# Patient Record
Sex: MALE | Race: WHITE | NOT HISPANIC OR LATINO | URBAN - METROPOLITAN AREA
[De-identification: names, ages, dates, MRNs, and addresses within clinical notes are randomized per-mention and may not be internally consistent; named-entity substitution may affect disease eponyms.]

---

## 2017-08-17 ENCOUNTER — EMERGENCY (EMERGENCY)
Facility: HOSPITAL | Age: 27
LOS: 0 days | Discharge: HOME | End: 2017-08-17
Admitting: FAMILY MEDICINE

## 2017-08-17 DIAGNOSIS — F32.9 MAJOR DEPRESSIVE DISORDER, SINGLE EPISODE, UNSPECIFIED: ICD-10-CM

## 2017-08-17 DIAGNOSIS — R50.9 FEVER, UNSPECIFIED: ICD-10-CM

## 2017-08-17 DIAGNOSIS — R19.7 DIARRHEA, UNSPECIFIED: ICD-10-CM

## 2017-08-17 DIAGNOSIS — F60.9 PERSONALITY DISORDER, UNSPECIFIED: ICD-10-CM

## 2017-08-17 DIAGNOSIS — F17.200 NICOTINE DEPENDENCE, UNSPECIFIED, UNCOMPLICATED: ICD-10-CM

## 2017-08-17 DIAGNOSIS — Z88.8 ALLERGY STATUS TO OTHER DRUGS, MEDICAMENTS AND BIOLOGICAL SUBSTANCES STATUS: ICD-10-CM

## 2017-08-17 DIAGNOSIS — J45.909 UNSPECIFIED ASTHMA, UNCOMPLICATED: ICD-10-CM

## 2017-08-17 DIAGNOSIS — F41.0 PANIC DISORDER [EPISODIC PAROXYSMAL ANXIETY]: ICD-10-CM

## 2017-08-17 DIAGNOSIS — R51 HEADACHE: ICD-10-CM

## 2017-08-17 DIAGNOSIS — F13.20 SEDATIVE, HYPNOTIC OR ANXIOLYTIC DEPENDENCE, UNCOMPLICATED: ICD-10-CM

## 2017-08-17 DIAGNOSIS — F41.9 ANXIETY DISORDER, UNSPECIFIED: ICD-10-CM

## 2017-08-17 DIAGNOSIS — F11.20 OPIOID DEPENDENCE, UNCOMPLICATED: ICD-10-CM

## 2017-08-17 DIAGNOSIS — F31.9 BIPOLAR DISORDER, UNSPECIFIED: ICD-10-CM

## 2018-01-31 PROBLEM — Z00.00 ENCOUNTER FOR PREVENTIVE HEALTH EXAMINATION: Status: ACTIVE | Noted: 2018-01-31

## 2018-11-08 ENCOUNTER — LABORATORY RESULT (OUTPATIENT)
Age: 28
End: 2018-11-08

## 2018-11-08 ENCOUNTER — OUTPATIENT (OUTPATIENT)
Dept: OUTPATIENT SERVICES | Facility: HOSPITAL | Age: 28
LOS: 1 days | Discharge: HOME | End: 2018-11-08

## 2018-11-08 ENCOUNTER — APPOINTMENT (OUTPATIENT)
Dept: UROLOGY | Facility: CLINIC | Age: 28
End: 2018-11-08
Payer: COMMERCIAL

## 2018-11-08 VITALS
DIASTOLIC BLOOD PRESSURE: 73 MMHG | BODY MASS INDEX: 21.66 KG/M2 | WEIGHT: 138 LBS | HEART RATE: 91 BPM | HEIGHT: 67 IN | SYSTOLIC BLOOD PRESSURE: 118 MMHG

## 2018-11-08 DIAGNOSIS — Z72.0 TOBACCO USE: ICD-10-CM

## 2018-11-08 DIAGNOSIS — Z84.89 FAMILY HISTORY OF OTHER SPECIFIED CONDITIONS: ICD-10-CM

## 2018-11-08 DIAGNOSIS — K21.9 GASTRO-ESOPHAGEAL REFLUX DISEASE W/OUT ESOPHAGITIS: ICD-10-CM

## 2018-11-08 DIAGNOSIS — Z78.9 OTHER SPECIFIED HEALTH STATUS: ICD-10-CM

## 2018-11-08 DIAGNOSIS — Z87.440 PERSONAL HISTORY OF URINARY (TRACT) INFECTIONS: ICD-10-CM

## 2018-11-08 PROCEDURE — 99243 OFF/OP CNSLTJ NEW/EST LOW 30: CPT

## 2018-11-08 RX ORDER — RANITIDINE 300 MG/1
300 TABLET ORAL
Refills: 0 | Status: ACTIVE | COMMUNITY

## 2018-11-08 RX ORDER — TESTOSTERONE 30 MG/1.5ML
SOLUTION TOPICAL
Refills: 0 | Status: ACTIVE | COMMUNITY

## 2018-11-09 DIAGNOSIS — N39.0 URINARY TRACT INFECTION, SITE NOT SPECIFIED: ICD-10-CM

## 2018-11-11 NOTE — LETTER BODY
[Dear  ___] : Dear  [unfilled], [Consult Letter:] : I had the pleasure of evaluating your patient, [unfilled]. [Please see my note below.] : Please see my note below. [Consult Closing:] : Thank you very much for allowing me to participate in the care of this patient.  If you have any questions, please do not hesitate to contact me. [Sincerely,] : Sincerely, [FreeTextEntry2] : Dr. Anthony Paulino

## 2018-11-11 NOTE — REVIEW OF SYSTEMS
[see HPI] : see HPI [Dysuria] : dysuria [Negative] : Heme/Lymph [Incontinence] : no incontinence [Nocturia] : no nocturia

## 2018-11-11 NOTE — HISTORY OF PRESENT ILLNESS
[None] : None [FreeTextEntry1] : RADHA NGUYEN is a 28 year old male with a past medical history of  . Presents to the office today for UTI  x 4 months. Patient states that since 6/2018, patient states he was diagnosed by ALEXSANDRA Paulino and was treated with different antibiotics but UTI never resolved. Denies gross hematuria, dysuria, fever, or chills.\par \par 10/8/2018 UA.- negative/ Urine culture 10,000-50,000 Enterobacter aerogenes\par 9/6/2018 UA negative/Urine culture 1,000-9,000 Staphylococcus epidermidis\par \par \par US 8/27/2018\par -Prevoid 460 cc/Postvoid 32 cc\par -No bladder mass\par -No hydronephrosis or stones bilaterally\par -4-5 mm hyperechoic focus in the renal cortex of the left mid kidney\par

## 2018-11-11 NOTE — ASSESSMENT
[Urinary Tract Infection (599.0\N39.0)] : qualitative ~C was positive [FreeTextEntry1] : 28 year old transgender female to male with female external genitalia\par with UTI x 4 months, asymptomatic\par \par -UA and urine culture ordered, instructed on how to obtain a clean catch urine\par -F/U in 2 weeks\par - discussed the importance of obtaining an appropriate urine sample to detrmine the presence of an active UTI\par discussed the potential for false positive urine cultures\par will re-assess once urine studies completed

## 2018-11-11 NOTE — END OF VISIT
[FreeTextEntry3] : I have seen and Evaluated the patient with NP Renee Shirley\par I agree with the content of her progress note and the plan of care outlined\par

## 2018-11-12 LAB
APPEARANCE: ABNORMAL
BACTERIA UR CULT: NORMAL
BILIRUBIN URINE: NEGATIVE
BLOOD URINE: NEGATIVE
COLOR: YELLOW
GLUCOSE QUALITATIVE U: NEGATIVE MG/DL
KETONES URINE: NEGATIVE
LEUKOCYTE ESTERASE URINE: NEGATIVE
NITRITE URINE: NEGATIVE
PH URINE: 7.5
PROTEIN URINE: NEGATIVE MG/DL
SPECIFIC GRAVITY URINE: 1.02
UROBILINOGEN URINE: 0.2 MG/DL (ref 0.2–?)

## 2018-11-29 ENCOUNTER — APPOINTMENT (OUTPATIENT)
Dept: UROLOGY | Facility: CLINIC | Age: 28
End: 2018-11-29
Payer: COMMERCIAL

## 2018-11-29 VITALS
WEIGHT: 138 LBS | HEIGHT: 67 IN | SYSTOLIC BLOOD PRESSURE: 109 MMHG | BODY MASS INDEX: 21.66 KG/M2 | HEART RATE: 72 BPM | DIASTOLIC BLOOD PRESSURE: 66 MMHG

## 2018-11-29 DIAGNOSIS — N39.0 URINARY TRACT INFECTION, SITE NOT SPECIFIED: ICD-10-CM

## 2018-11-29 PROCEDURE — 99213 OFFICE O/P EST LOW 20 MIN: CPT

## 2019-02-25 ENCOUNTER — OUTPATIENT (OUTPATIENT)
Dept: OUTPATIENT SERVICES | Facility: HOSPITAL | Age: 29
LOS: 1 days | Discharge: HOME | End: 2019-02-25

## 2019-02-25 DIAGNOSIS — E29.1 TESTICULAR HYPOFUNCTION: ICD-10-CM

## 2019-02-25 DIAGNOSIS — R94.6 ABNORMAL RESULTS OF THYROID FUNCTION STUDIES: ICD-10-CM

## 2019-02-25 DIAGNOSIS — E13.9 OTHER SPECIFIED DIABETES MELLITUS WITHOUT COMPLICATIONS: ICD-10-CM

## 2019-02-25 DIAGNOSIS — R53.82 CHRONIC FATIGUE, UNSPECIFIED: ICD-10-CM

## 2019-02-25 DIAGNOSIS — Z00.00 ENCOUNTER FOR GENERAL ADULT MEDICAL EXAMINATION WITHOUT ABNORMAL FINDINGS: ICD-10-CM

## 2019-02-25 DIAGNOSIS — E78.5 HYPERLIPIDEMIA, UNSPECIFIED: ICD-10-CM

## 2020-11-01 ENCOUNTER — INPATIENT (INPATIENT)
Facility: HOSPITAL | Age: 30
LOS: 0 days | Discharge: HOME | End: 2020-11-02
Attending: INTERNAL MEDICINE | Admitting: INTERNAL MEDICINE
Payer: COMMERCIAL

## 2020-11-01 VITALS
OXYGEN SATURATION: 98 % | RESPIRATION RATE: 18 BRPM | WEIGHT: 145.06 LBS | DIASTOLIC BLOOD PRESSURE: 81 MMHG | SYSTOLIC BLOOD PRESSURE: 142 MMHG | HEART RATE: 112 BPM | TEMPERATURE: 99 F

## 2020-11-01 LAB
ALBUMIN SERPL ELPH-MCNC: 4.2 G/DL — SIGNIFICANT CHANGE UP (ref 3.5–5.2)
ALP SERPL-CCNC: 74 U/L — SIGNIFICANT CHANGE UP (ref 30–115)
ALT FLD-CCNC: 106 U/L — HIGH (ref 0–41)
ANION GAP SERPL CALC-SCNC: 16 MMOL/L — HIGH (ref 7–14)
APPEARANCE UR: CLEAR — SIGNIFICANT CHANGE UP
AST SERPL-CCNC: 217 U/L — HIGH (ref 0–41)
BASE EXCESS BLDV CALC-SCNC: 3 MMOL/L — HIGH (ref -2–2)
BASOPHILS # BLD AUTO: 0.03 K/UL — SIGNIFICANT CHANGE UP (ref 0–0.2)
BASOPHILS NFR BLD AUTO: 0.8 % — SIGNIFICANT CHANGE UP (ref 0–1)
BILIRUB DIRECT SERPL-MCNC: 0.2 MG/DL — SIGNIFICANT CHANGE UP (ref 0–0.2)
BILIRUB INDIRECT FLD-MCNC: 0.4 MG/DL — SIGNIFICANT CHANGE UP (ref 0.2–1.2)
BILIRUB SERPL-MCNC: 0.6 MG/DL — SIGNIFICANT CHANGE UP (ref 0.2–1.2)
BILIRUB UR-MCNC: NEGATIVE — SIGNIFICANT CHANGE UP
BUN SERPL-MCNC: 19 MG/DL — SIGNIFICANT CHANGE UP (ref 10–20)
CA-I SERPL-SCNC: 1.11 MMOL/L — LOW (ref 1.12–1.3)
CALCIUM SERPL-MCNC: 9.2 MG/DL — SIGNIFICANT CHANGE UP (ref 8.5–10.1)
CHLORIDE SERPL-SCNC: 98 MMOL/L — SIGNIFICANT CHANGE UP (ref 98–110)
CO2 SERPL-SCNC: 23 MMOL/L — SIGNIFICANT CHANGE UP (ref 17–32)
COLOR SPEC: SIGNIFICANT CHANGE UP
CREAT SERPL-MCNC: 0.9 MG/DL — SIGNIFICANT CHANGE UP (ref 0.7–1.5)
DIFF PNL FLD: NEGATIVE — SIGNIFICANT CHANGE UP
EOSINOPHIL # BLD AUTO: 0.06 K/UL — SIGNIFICANT CHANGE UP (ref 0–0.7)
EOSINOPHIL NFR BLD AUTO: 1.6 % — SIGNIFICANT CHANGE UP (ref 0–8)
GAS PNL BLDV: 137 MMOL/L — SIGNIFICANT CHANGE UP (ref 136–145)
GAS PNL BLDV: SIGNIFICANT CHANGE UP
GAS PNL BLDV: SIGNIFICANT CHANGE UP
GLUCOSE SERPL-MCNC: 93 MG/DL — SIGNIFICANT CHANGE UP (ref 70–99)
GLUCOSE UR QL: NEGATIVE — SIGNIFICANT CHANGE UP
HCO3 BLDV-SCNC: 27 MMOL/L — SIGNIFICANT CHANGE UP (ref 22–29)
HCT VFR BLD CALC: 41.9 % — LOW (ref 42–52)
HCT VFR BLDA CALC: 48 % — HIGH (ref 34–44)
HGB BLD CALC-MCNC: 15.7 G/DL — SIGNIFICANT CHANGE UP (ref 14–18)
HGB BLD-MCNC: 14.5 G/DL — SIGNIFICANT CHANGE UP (ref 14–18)
IMM GRANULOCYTES NFR BLD AUTO: 0.3 % — SIGNIFICANT CHANGE UP (ref 0.1–0.3)
KETONES UR-MCNC: ABNORMAL
LACTATE BLDV-MCNC: 2.3 MMOL/L — HIGH (ref 0.5–1.6)
LACTATE SERPL-SCNC: 4.3 MMOL/L — CRITICAL HIGH (ref 0.7–2)
LEUKOCYTE ESTERASE UR-ACNC: NEGATIVE — SIGNIFICANT CHANGE UP
LIDOCAIN IGE QN: 30 U/L — SIGNIFICANT CHANGE UP (ref 7–60)
LYMPHOCYTES # BLD AUTO: 0.92 K/UL — LOW (ref 1.2–3.4)
LYMPHOCYTES # BLD AUTO: 23.8 % — SIGNIFICANT CHANGE UP (ref 20.5–51.1)
MCHC RBC-ENTMCNC: 34.6 G/DL — SIGNIFICANT CHANGE UP (ref 32–37)
MCHC RBC-ENTMCNC: 34.9 PG — HIGH (ref 27–31)
MCV RBC AUTO: 100.7 FL — HIGH (ref 80–94)
MONOCYTES # BLD AUTO: 0.51 K/UL — SIGNIFICANT CHANGE UP (ref 0.1–0.6)
MONOCYTES NFR BLD AUTO: 13.2 % — HIGH (ref 1.7–9.3)
NEUTROPHILS # BLD AUTO: 2.33 K/UL — SIGNIFICANT CHANGE UP (ref 1.4–6.5)
NEUTROPHILS NFR BLD AUTO: 60.3 % — SIGNIFICANT CHANGE UP (ref 42.2–75.2)
NITRITE UR-MCNC: NEGATIVE — SIGNIFICANT CHANGE UP
NRBC # BLD: 0 /100 WBCS — SIGNIFICANT CHANGE UP (ref 0–0)
PCO2 BLDV: 37 MMHG — LOW (ref 41–51)
PH BLDV: 7.47 — HIGH (ref 7.26–7.43)
PH UR: 7 — SIGNIFICANT CHANGE UP (ref 5–8)
PLATELET # BLD AUTO: 168 K/UL — SIGNIFICANT CHANGE UP (ref 130–400)
PO2 BLDV: 49 MMHG — HIGH (ref 20–40)
POTASSIUM BLDV-SCNC: 3.8 MMOL/L — SIGNIFICANT CHANGE UP (ref 3.3–5.6)
POTASSIUM SERPL-MCNC: 4.3 MMOL/L — SIGNIFICANT CHANGE UP (ref 3.5–5)
POTASSIUM SERPL-SCNC: 4.3 MMOL/L — SIGNIFICANT CHANGE UP (ref 3.5–5)
PROT SERPL-MCNC: 6.6 G/DL — SIGNIFICANT CHANGE UP (ref 6–8)
PROT UR-MCNC: SIGNIFICANT CHANGE UP
RBC # BLD: 4.16 M/UL — LOW (ref 4.7–6.1)
RBC # FLD: 11.5 % — SIGNIFICANT CHANGE UP (ref 11.5–14.5)
SAO2 % BLDV: 86 % — SIGNIFICANT CHANGE UP
SODIUM SERPL-SCNC: 137 MMOL/L — SIGNIFICANT CHANGE UP (ref 135–146)
SP GR SPEC: >1.05 (ref 1.01–1.03)
UROBILINOGEN FLD QL: SIGNIFICANT CHANGE UP
WBC # BLD: 3.86 K/UL — LOW (ref 4.8–10.8)
WBC # FLD AUTO: 3.86 K/UL — LOW (ref 4.8–10.8)

## 2020-11-01 PROCEDURE — 76705 ECHO EXAM OF ABDOMEN: CPT | Mod: 26

## 2020-11-01 PROCEDURE — 99223 1ST HOSP IP/OBS HIGH 75: CPT

## 2020-11-01 PROCEDURE — 93010 ELECTROCARDIOGRAM REPORT: CPT

## 2020-11-01 PROCEDURE — 74177 CT ABD & PELVIS W/CONTRAST: CPT | Mod: 26

## 2020-11-01 PROCEDURE — 99285 EMERGENCY DEPT VISIT HI MDM: CPT

## 2020-11-01 PROCEDURE — 71045 X-RAY EXAM CHEST 1 VIEW: CPT | Mod: 26

## 2020-11-01 RX ORDER — MORPHINE SULFATE 50 MG/1
4 CAPSULE, EXTENDED RELEASE ORAL ONCE
Refills: 0 | Status: DISCONTINUED | OUTPATIENT
Start: 2020-11-01 | End: 2020-11-01

## 2020-11-01 RX ORDER — SODIUM CHLORIDE 9 MG/ML
1000 INJECTION, SOLUTION INTRAVENOUS ONCE
Refills: 0 | Status: COMPLETED | OUTPATIENT
Start: 2020-11-01 | End: 2020-11-01

## 2020-11-01 RX ORDER — ONDANSETRON 8 MG/1
4 TABLET, FILM COATED ORAL ONCE
Refills: 0 | Status: COMPLETED | OUTPATIENT
Start: 2020-11-01 | End: 2020-11-01

## 2020-11-01 RX ADMIN — Medication 50 MILLIGRAM(S): at 14:30

## 2020-11-01 RX ADMIN — SODIUM CHLORIDE 1000 MILLILITER(S): 9 INJECTION, SOLUTION INTRAVENOUS at 16:05

## 2020-11-01 RX ADMIN — SODIUM CHLORIDE 1000 MILLILITER(S): 9 INJECTION, SOLUTION INTRAVENOUS at 14:27

## 2020-11-01 RX ADMIN — MORPHINE SULFATE 4 MILLIGRAM(S): 50 CAPSULE, EXTENDED RELEASE ORAL at 21:07

## 2020-11-01 RX ADMIN — MORPHINE SULFATE 4 MILLIGRAM(S): 50 CAPSULE, EXTENDED RELEASE ORAL at 14:24

## 2020-11-01 RX ADMIN — Medication 2 MILLIGRAM(S): at 21:56

## 2020-11-01 RX ADMIN — ONDANSETRON 4 MILLIGRAM(S): 8 TABLET, FILM COATED ORAL at 14:22

## 2020-11-01 NOTE — ED PROVIDER NOTE - PROGRESS NOTE DETAILS
fuchs: patient persistently tremulous, tachycardiac. patient tolerated librium. will give ativan and reassess. pt noted to have a cbc of 10. gi and surgery paged. awaiting call back. discussed case with gi, recommend pain medication and fluids. alcoholic hepatitis vs choledocholithiais. will evaluate tomorrow on floor. leopoldo: pt signed out to Dr. Hughes. discussed case w/ surgery, they are aware and will eval in ed. patient evaluated by surgery, no interventions at this time. will follow on floor.

## 2020-11-01 NOTE — CONSULT NOTE ADULT - SUBJECTIVE AND OBJECTIVE BOX
RADHA NGUYEN [id number]  30y Male PMH/PSH listed below presents with 4-5 days of epigastric abdominal pain radiating to left side. Pain has been progressively worsening since onset and progressed to having episdoes of nausea and bilious emesis. Due to persistence of symptoms presented to ED for evaluation and recommendations' Workup showed hepatic steatosis and CBD of 10mm. Patient has history of reflux treated with famotidine but has no other similar episodes. Recently started new medication "Vibrant" day before onset of symptoms. Denies ilicit drug use but smokes ~5 cigarettes per day and states he has consumed at least 12 shots of vodka per day since COVID quarantine      PAST MEDICAL & SURGICAL HISTORY:  Depression/Anxiety  Hysterectomy  Mastectomy        Allergies    fish (Other)  iodine (Other)    Intolerances        REVIEW OF SYSTEMS    [X] A ten-point review of systems was otherwise negative except as noted.  [ ] Due to altered mental status/intubation, subjective information were not able to be obtained from the patient. History was obtained, to the extent possible, from review of the chart and collateral sources of information.      Vital Signs Last 24 Hrs  T(C): 37 (01 Nov 2020 21:41), Max: 37 (01 Nov 2020 13:10)  T(F): 98.6 (01 Nov 2020 21:41), Max: 98.6 (01 Nov 2020 13:10)  HR: 94 (01 Nov 2020 21:41) (71 - 112)  BP: 118/83 (01 Nov 2020 21:41) (111/56 - 142/81)  BP(mean): --  RR: 18 (01 Nov 2020 21:41) (18 - 18)  SpO2: 99% (01 Nov 2020 21:41) (97% - 99%)    PHYSICAL EXAM:  GENERAL: NAD  CHEST/LUNG: Clear to auscultation bilaterally  HEART: Regular rate and rhythm  ABDOMEN: Soft, epigastric tenderness, pain radiates to left side, Nondistended;   EXTREMITIES:  No clubbing, cyanosis, or edema      LABS:  Labs:  CAPILLARY BLOOD GLUCOSE                              14.5   3.86  )-----------( 168      ( 01 Nov 2020 14:02 )             41.9       Auto Neutrophil %: 60.3 % (11-01-20 @ 14:02)  Auto Immature Granulocyte %: 0.3 % (11-01-20 @ 14:02)    11-01    137  |  98  |  19  ----------------------------<  93  4.3   |  23  |  0.9      Calcium, Total Serum: 9.2 mg/dL (11-01-20 @ 14:02)      LFTs:             6.6  | 0.6  | 217      ------------------[74      ( 01 Nov 2020 14:02 )  4.2  | 0.2  | 106         Lipase:30     Amylase:x         Blood Gas Venous - Lactate: 2.3 mmoL/L (11-01-20 @ 20:05)  Lactate, Blood: 4.3 mmol/L (11-01-20 @ 14:02)      Coags:            Urinalysis Basic - ( 01 Nov 2020 18:05 )    Color: Light Yellow / Appearance: Clear / SG: >1.050 / pH: x  Gluc: x / Ketone: Small  / Bili: Negative / Urobili: <2 mg/dL   Blood: x / Protein: Trace / Nitrite: Negative   Leuk Esterase: Negative / RBC: x / WBC x   Sq Epi: x / Non Sq Epi: x / Bacteria: x            RADIOLOGY & ADDITIONAL STUDIES:  < from: US Abdomen Limited (11.01.20 @ 19:41) >    Hepatic steatosis.    Dilated common bile duct measuring 10 mm.      < end of copied text >  < from: CT Abdomen and Pelvis w/ IV Cont (11.01.20 @ 16:08) >  1. New diffuse hepatic steatosis and dilated common bile duct    2. Status post hysterectomy.    < end of copied text >

## 2020-11-01 NOTE — ED ADULT TRIAGE NOTE - CHIEF COMPLAINT QUOTE
Pt presenting with abdominal pain that radiates to the left flank area since Thursday and had 2 episodes of vomiting, pt reports he has "12 shots of vodka daily" last drink was this morning.  Pt unable to tolerate PO.  Denies any SI or HI

## 2020-11-01 NOTE — CONSULT NOTE ADULT - ASSESSMENT
30M with elevated lfts, hepatic steatosis and dilated CBD presents with several days of abdominal pain    Plan:  No acute surgical intervention at this time  GI evaluation  Trend LFTs  MRCP  Pain/symptom control    Will continue to follow     d/w Dr. Serna

## 2020-11-01 NOTE — ED PROVIDER NOTE - NS ED ROS FT
Review of Systems:  	•	CONSTITUTIONAL: no fever, no diaphoresis, no chills  	•	SKIN: no rash  	•	HEMATOLOGIC: no bleeding, no bruising  	•	EYES: no blurry vision  	•	ENT: no sore throat, no difficulty swallowing   	•	RESPIRATORY: no shortness of breath, no cough  	•	CARDIAC: no chest pain, no palpitations  	•	GI: +epigastric pain, +nausea, +vomiting. no diarrhea   	•	GENITO-URINARY:  no dysuria; no hematuria, no increased urinary frequency  	•	MUSCULOSKELETAL: no joint paint, no swelling, no redness  	•	NEUROLOGIC: no weakness, numbness, paresthesias, syncope

## 2020-11-01 NOTE — ED PROVIDER NOTE - CARE PLAN
Principal Discharge DX:	Common bile duct dilatation  Secondary Diagnosis:	Transaminitis  Secondary Diagnosis:	Alcohol use  Secondary Diagnosis:	Hepatic steatosis

## 2020-11-01 NOTE — ED PROVIDER NOTE - CLINICAL SUMMARY MEDICAL DECISION MAKING FREE TEXT BOX
evaluated for abdominal pain, found to have elevated lfts, dilated cbd. patient will need mrcp with gi for further evaluation. patient admitted

## 2020-11-01 NOTE — ED PROVIDER NOTE - ATTENDING CONTRIBUTION TO CARE
30 yr old m w/ a pmh significant for alcohol/substance, transgender F to M, who presents with abdominal pain. Pt states that the abdominal pain has been going on for various days. The abdominal pain has been so severe that pt has not been able to tolerate PO today. Of note, pt usually drinks about 12 shots of alcohol a day, however, today only drank one. Pt denies any fevers, chills, urinary symptoms, or any other complaints.     Review of Systems    Constitutional: (-) fever  Cardiovascular: (-) chest pain, (-) syncope  Respiratory: (-) cough, (-) shortness of breath  Gastrointestinal: (-) vomiting, (-) diarrhea, (+) abdominal pain  Musculoskeletal: (-) neck pain, (-) back pain, (-) joint pain  Integumentary: (-) rash, (-) edema  Neurological: (-) headache, (-) altered mental status    Except as documented in the HPI, all other systems are negative.    VITAL SIGNS: I have reviewed nursing notes and confirm.  CONSTITUTIONAL: non-toxic, well appearing  SKIN: no rash, no petechiae.  EYES: PERRL, EOMI, pink conjunctiva, anicteric  ENT: tongue midline, no exudates, MMM. no tongue fasciculations   NECK: Supple; no meningismus, no JVD  CARD: RRR, no murmurs, equal radial pulses bilaterally 2+  RESP: CTAB, no respiratory distress  ABD: Soft, tender in the upper abdomen, non-distended, no peritoneal signs, no HSM, no CVA tenderness  EXT: Normal ROM x4. No edema. No calves tenderness  NEURO: Alert, oriented. CN2-12 intact, equal strength bilaterally, nl gait.  PSYCH: Cooperative, appropriate.    a/p  30 yr old f that presents with abdominal pain, concern for possible withdrawal  -labs  -imaging  -IVF  -librium  -pain management  -dispo as per above

## 2020-11-01 NOTE — ED PROVIDER NOTE - PHYSICAL EXAMINATION
CONSTITUTIONAL: Anxious appearing male, non-toxic appearing   SKIN: skin exam is warm and dry,  HEAD: Normocephalic; atraumatic.  EYES:  conjunctiva and sclera clear.  ENT: Dry MM   NECK: ROM intact  CARD: Tachycardiac, regular rhythm  RESP: No wheezes, rales or rhonchi. Good air movement bilaterally  ABD: +TTP overlying epigastric and LLQ, no rebound. no cvat  EXT: Normal ROM.   NEURO: awake, alert, following commands, oriented, grossly unremarkable. No Focal deficits. GCS 15.   PSYCH: Cooperative, appropriate.

## 2020-11-01 NOTE — ED PROVIDER NOTE - OBJECTIVE STATEMENT
30 year old male, past medical history alcohol use disorder, transgender, substance use disorder, who presents with epigastric pain radiating to back x5 days. Patient reports constant, worsening pain with associated nausea/vomiting. Patient has been unable to tolerate po today. No f/c, CP, SOB, urinary symptoms, bowel changes, skin changes. Patient denies IVDU, has been sober x10 years. Patient endorses daily alcohol use, approximately 10-12shots vodka/day, last drink this am. Denies hx withdrawal seizures.

## 2020-11-02 ENCOUNTER — TRANSCRIPTION ENCOUNTER (OUTPATIENT)
Age: 30
End: 2020-11-02

## 2020-11-02 VITALS
DIASTOLIC BLOOD PRESSURE: 70 MMHG | HEART RATE: 84 BPM | SYSTOLIC BLOOD PRESSURE: 118 MMHG | TEMPERATURE: 98 F | OXYGEN SATURATION: 99 % | RESPIRATION RATE: 18 BRPM

## 2020-11-02 LAB
ALBUMIN SERPL ELPH-MCNC: 3.8 G/DL — SIGNIFICANT CHANGE UP (ref 3.5–5.2)
ALP SERPL-CCNC: 65 U/L — SIGNIFICANT CHANGE UP (ref 30–115)
ALT FLD-CCNC: 86 U/L — HIGH (ref 0–41)
ANION GAP SERPL CALC-SCNC: 13 MMOL/L — SIGNIFICANT CHANGE UP (ref 7–14)
APTT BLD: 26.2 SEC — LOW (ref 27–39.2)
AST SERPL-CCNC: 157 U/L — HIGH (ref 0–41)
BASOPHILS # BLD AUTO: 0.03 K/UL — SIGNIFICANT CHANGE UP (ref 0–0.2)
BASOPHILS NFR BLD AUTO: 0.8 % — SIGNIFICANT CHANGE UP (ref 0–1)
BILIRUB SERPL-MCNC: 0.9 MG/DL — SIGNIFICANT CHANGE UP (ref 0.2–1.2)
BUN SERPL-MCNC: 14 MG/DL — SIGNIFICANT CHANGE UP (ref 10–20)
CALCIUM SERPL-MCNC: 9.1 MG/DL — SIGNIFICANT CHANGE UP (ref 8.5–10.1)
CHLORIDE SERPL-SCNC: 98 MMOL/L — SIGNIFICANT CHANGE UP (ref 98–110)
CO2 SERPL-SCNC: 27 MMOL/L — SIGNIFICANT CHANGE UP (ref 17–32)
CREAT SERPL-MCNC: 1 MG/DL — SIGNIFICANT CHANGE UP (ref 0.7–1.5)
EOSINOPHIL # BLD AUTO: 0.05 K/UL — SIGNIFICANT CHANGE UP (ref 0–0.7)
EOSINOPHIL NFR BLD AUTO: 1.3 % — SIGNIFICANT CHANGE UP (ref 0–8)
GLUCOSE SERPL-MCNC: 80 MG/DL — SIGNIFICANT CHANGE UP (ref 70–99)
HCT VFR BLD CALC: 38.8 % — LOW (ref 42–52)
HGB BLD-MCNC: 13.2 G/DL — LOW (ref 14–18)
IMM GRANULOCYTES NFR BLD AUTO: 0.3 % — SIGNIFICANT CHANGE UP (ref 0.1–0.3)
INR BLD: 1.05 RATIO — SIGNIFICANT CHANGE UP (ref 0.65–1.3)
LACTATE SERPL-SCNC: 1.1 MMOL/L — SIGNIFICANT CHANGE UP (ref 0.7–2)
LYMPHOCYTES # BLD AUTO: 1.43 K/UL — SIGNIFICANT CHANGE UP (ref 1.2–3.4)
LYMPHOCYTES # BLD AUTO: 35.9 % — SIGNIFICANT CHANGE UP (ref 20.5–51.1)
MAGNESIUM SERPL-MCNC: 1.5 MG/DL — LOW (ref 1.8–2.4)
MCHC RBC-ENTMCNC: 34 G/DL — SIGNIFICANT CHANGE UP (ref 32–37)
MCHC RBC-ENTMCNC: 35.3 PG — HIGH (ref 27–31)
MCV RBC AUTO: 103.7 FL — HIGH (ref 80–94)
MONOCYTES # BLD AUTO: 0.51 K/UL — SIGNIFICANT CHANGE UP (ref 0.1–0.6)
MONOCYTES NFR BLD AUTO: 12.8 % — HIGH (ref 1.7–9.3)
NEUTROPHILS # BLD AUTO: 1.95 K/UL — SIGNIFICANT CHANGE UP (ref 1.4–6.5)
NEUTROPHILS NFR BLD AUTO: 48.9 % — SIGNIFICANT CHANGE UP (ref 42.2–75.2)
NRBC # BLD: 0 /100 WBCS — SIGNIFICANT CHANGE UP (ref 0–0)
PHOSPHATE SERPL-MCNC: 4.3 MG/DL — SIGNIFICANT CHANGE UP (ref 2.1–4.9)
PLATELET # BLD AUTO: 137 K/UL — SIGNIFICANT CHANGE UP (ref 130–400)
POTASSIUM SERPL-MCNC: 4.3 MMOL/L — SIGNIFICANT CHANGE UP (ref 3.5–5)
POTASSIUM SERPL-SCNC: 4.3 MMOL/L — SIGNIFICANT CHANGE UP (ref 3.5–5)
PROT SERPL-MCNC: 5.8 G/DL — LOW (ref 6–8)
PROTHROM AB SERPL-ACNC: 12.1 SEC — SIGNIFICANT CHANGE UP (ref 9.95–12.87)
RBC # BLD: 3.74 M/UL — LOW (ref 4.7–6.1)
RBC # FLD: 11.4 % — LOW (ref 11.5–14.5)
SARS-COV-2 RNA SPEC QL NAA+PROBE: SIGNIFICANT CHANGE UP
SODIUM SERPL-SCNC: 138 MMOL/L — SIGNIFICANT CHANGE UP (ref 135–146)
VIT B12 SERPL-MCNC: >2000 PG/ML — HIGH (ref 232–1245)
WBC # BLD: 3.98 K/UL — LOW (ref 4.8–10.8)
WBC # FLD AUTO: 3.98 K/UL — LOW (ref 4.8–10.8)

## 2020-11-02 PROCEDURE — 74181 MRI ABDOMEN W/O CONTRAST: CPT | Mod: 26

## 2020-11-02 PROCEDURE — 99223 1ST HOSP IP/OBS HIGH 75: CPT | Mod: AI

## 2020-11-02 PROCEDURE — 99232 SBSQ HOSP IP/OBS MODERATE 35: CPT

## 2020-11-02 PROCEDURE — 99223 1ST HOSP IP/OBS HIGH 75: CPT

## 2020-11-02 RX ORDER — FAMOTIDINE 10 MG/ML
40 INJECTION INTRAVENOUS AT BEDTIME
Refills: 0 | Status: DISCONTINUED | OUTPATIENT
Start: 2020-11-02 | End: 2020-11-02

## 2020-11-02 RX ORDER — PREGABALIN 225 MG/1
1000 CAPSULE ORAL DAILY
Refills: 0 | Status: DISCONTINUED | OUTPATIENT
Start: 2020-11-02 | End: 2020-11-02

## 2020-11-02 RX ORDER — ACETAMINOPHEN 500 MG
650 TABLET ORAL ONCE
Refills: 0 | Status: COMPLETED | OUTPATIENT
Start: 2020-11-02 | End: 2020-11-02

## 2020-11-02 RX ORDER — CLONAZEPAM 1 MG
1 TABLET ORAL
Refills: 0 | Status: DISCONTINUED | OUTPATIENT
Start: 2020-11-02 | End: 2020-11-02

## 2020-11-02 RX ORDER — NICOTINE POLACRILEX 2 MG
1 GUM BUCCAL DAILY
Refills: 0 | Status: DISCONTINUED | OUTPATIENT
Start: 2020-11-02 | End: 2020-11-02

## 2020-11-02 RX ORDER — THIAMINE MONONITRATE (VIT B1) 100 MG
1 TABLET ORAL
Qty: 30 | Refills: 0
Start: 2020-11-02 | End: 2020-12-01

## 2020-11-02 RX ORDER — OXYCODONE HYDROCHLORIDE 5 MG/1
10 TABLET ORAL ONCE
Refills: 0 | Status: DISCONTINUED | OUTPATIENT
Start: 2020-11-02 | End: 2020-11-02

## 2020-11-02 RX ORDER — CHLORHEXIDINE GLUCONATE 213 G/1000ML
1 SOLUTION TOPICAL ONCE
Refills: 0 | Status: DISCONTINUED | OUTPATIENT
Start: 2020-11-02 | End: 2020-11-02

## 2020-11-02 RX ORDER — FOLIC ACID 0.8 MG
1 TABLET ORAL
Qty: 30 | Refills: 0
Start: 2020-11-02 | End: 2020-12-01

## 2020-11-02 RX ORDER — ACETAMINOPHEN 500 MG
650 TABLET ORAL EVERY 6 HOURS
Refills: 0 | Status: DISCONTINUED | OUTPATIENT
Start: 2020-11-02 | End: 2020-11-02

## 2020-11-02 RX ORDER — ENOXAPARIN SODIUM 100 MG/ML
40 INJECTION SUBCUTANEOUS DAILY
Refills: 0 | Status: DISCONTINUED | OUTPATIENT
Start: 2020-11-02 | End: 2020-11-02

## 2020-11-02 RX ORDER — CLONAZEPAM 1 MG
1 TABLET ORAL
Qty: 0 | Refills: 0 | DISCHARGE

## 2020-11-02 RX ORDER — FOLIC ACID 0.8 MG
1 TABLET ORAL DAILY
Refills: 0 | Status: DISCONTINUED | OUTPATIENT
Start: 2020-11-02 | End: 2020-11-02

## 2020-11-02 RX ORDER — SODIUM CHLORIDE 9 MG/ML
1000 INJECTION, SOLUTION INTRAVENOUS
Refills: 0 | Status: DISCONTINUED | OUTPATIENT
Start: 2020-11-02 | End: 2020-11-02

## 2020-11-02 RX ORDER — VILAZODONE HYDROCHLORIDE 20 MG/1
1 TABLET, FILM COATED ORAL
Qty: 0 | Refills: 0 | DISCHARGE

## 2020-11-02 RX ORDER — PREGABALIN 225 MG/1
1 CAPSULE ORAL
Qty: 30 | Refills: 0
Start: 2020-11-02 | End: 2020-12-01

## 2020-11-02 RX ORDER — MAGNESIUM SULFATE 500 MG/ML
2 VIAL (ML) INJECTION ONCE
Refills: 0 | Status: COMPLETED | OUTPATIENT
Start: 2020-11-02 | End: 2020-11-02

## 2020-11-02 RX ADMIN — Medication 50 GRAM(S): at 14:32

## 2020-11-02 RX ADMIN — ENOXAPARIN SODIUM 40 MILLIGRAM(S): 100 INJECTION SUBCUTANEOUS at 12:26

## 2020-11-02 RX ADMIN — SODIUM CHLORIDE 150 MILLILITER(S): 9 INJECTION, SOLUTION INTRAVENOUS at 14:44

## 2020-11-02 RX ADMIN — Medication 1 MILLIGRAM(S): at 18:15

## 2020-11-02 RX ADMIN — OXYCODONE HYDROCHLORIDE 10 MILLIGRAM(S): 5 TABLET ORAL at 06:07

## 2020-11-02 RX ADMIN — PREGABALIN 1000 MICROGRAM(S): 225 CAPSULE ORAL at 12:25

## 2020-11-02 RX ADMIN — Medication 1 PATCH: at 12:25

## 2020-11-02 RX ADMIN — SODIUM CHLORIDE 150 MILLILITER(S): 9 INJECTION, SOLUTION INTRAVENOUS at 05:04

## 2020-11-02 RX ADMIN — Medication 1 MILLIGRAM(S): at 05:03

## 2020-11-02 RX ADMIN — Medication 1 MILLIGRAM(S): at 12:24

## 2020-11-02 NOTE — PROGRESS NOTE ADULT - SUBJECTIVE AND OBJECTIVE BOX
Progress Note: General Surgery  Patient: RADHA NGUYEN , 30y (1990)Male   MRN: [id number]  Location: [address]  Visit: 11-01-20 Inpatient  Date: 11-02-20 @ 09:31    Procedure/Diagnosis:     Events/ 24h: No acute events overnight. Pain controlled.    Vitals: T(F): 97.7 (11-02-20 @ 07:44), Max: 98.6 (11-01-20 @ 13:10)  HR: 82 (11-02-20 @ 07:44)  BP: 113/56 (11-02-20 @ 07:44) (111/56 - 142/81)  RR: 18 (11-02-20 @ 07:44)  SpO2: 99% (11-02-20 @ 07:44)    In:   Out:   Net:     Diet: Diet, DASH/TLC:   Sodium & Cholesterol Restricted  Low Fat (LOWFAT) (11-02-20 @ 00:55)    IV Fluids: cyanocobalamin 1000 MICROGram(s) Oral daily  folic acid 1 milliGRAM(s) Oral daily  lactated ringers. 1000 milliLiter(s) (150 mL/Hr) IV Continuous <Continuous>  magnesium sulfate  IVPB 2 Gram(s) IV Intermittent once      Physical Examination:  GENERAL: NAD  CHEST/LUNG: Clear to auscultation bilaterally  HEART: Regular rate and rhythm  ABDOMEN: Soft, epigastric tenderness, pain radiates to left side, Nondistended;   EXTREMITIES:  No clubbing, cyanosis, or edema      Medications: [Standing]  chlorhexidine 4% Liquid 1 Application(s) Topical once  clonazePAM  Tablet 1 milliGRAM(s) Oral two times a day  cyanocobalamin 1000 MICROGram(s) Oral daily  enoxaparin Injectable 40 milliGRAM(s) SubCutaneous daily  famotidine    Tablet 40 milliGRAM(s) Oral at bedtime  folic acid 1 milliGRAM(s) Oral daily  lactated ringers. 1000 milliLiter(s) (150 mL/Hr) IV Continuous <Continuous>  magnesium sulfate  IVPB 2 Gram(s) IV Intermittent once  nicotine -   7 mG/24Hr(s) Patch 1 patch Transdermal daily    DVT Prophylaxis: enoxaparin Injectable 40 milliGRAM(s) SubCutaneous daily    GI Prophylaxis: famotidine    Tablet 40 milliGRAM(s) Oral at bedtime    Antibiotics:   Anticoagulation:   Medications:[PRN]      Labs:                        13.2   3.98  )-----------( 137      ( 02 Nov 2020 05:31 )             38.8     11-02    138  |  98  |  14  ----------------------------<  80  4.3   |  27  |  1.0    Ca    9.1      02 Nov 2020 05:31  Phos  4.3     11-02  Mg     1.5     11-02    TPro  5.8<L>  /  Alb  3.8  /  TBili  0.9  /  DBili  x   /  AST  157<H>  /  ALT  86<H>  /  AlkPhos  65  11-02    LIVER FUNCTIONS - ( 02 Nov 2020 05:31 )  Alb: 3.8 g/dL / Pro: 5.8 g/dL / ALK PHOS: 65 U/L / ALT: 86 U/L / AST: 157 U/L / GGT: x           PT/INR - ( 02 Nov 2020 05:31 )   PT: 12.10 sec;   INR: 1.05 ratio         PTT - ( 02 Nov 2020 05:31 )  PTT:26.2 sec        Urine/Micro:    Urinalysis Basic - ( 01 Nov 2020 18:05 )    Color: Light Yellow / Appearance: Clear / SG: >1.050 / pH: x  Gluc: x / Ketone: Small  / Bili: Negative / Urobili: <2 mg/dL   Blood: x / Protein: Trace / Nitrite: Negative   Leuk Esterase: Negative / RBC: x / WBC x   Sq Epi: x / Non Sq Epi: x / Bacteria: x        Imaging:     Assessment:  30M with elevated lfts, hepatic steatosis and dilated CBD presents with several days of abdominal pain    Plan:  GI evaluation  Trend LFTs  MRCP  ECHO      Date/Time: 11-02-20 @ 09:31

## 2020-11-02 NOTE — H&P ADULT - HISTORY OF PRESENT ILLNESS
30 year old male with past medical history of depression, anxiety, s/p hysterectomy s/p mastectomy presents with abdominal pain for 4 days.    Patient reports that he was in usual state of health 4 days ago when he suddenly developed an epigastric/umbilical pain "sharp burning" radiating to the left, initially 3/10 now 7/10, worse with food, motion, cough and improved with rest. Patients pain worsened over the next days and developed associated biliary vomiting consisting of food contents mainly and loss of appetite. Patient denies any fevers, chills, rigors, outside food intake, numbness, tingling, jaundice or any other complaints.    Of note drinks 10-12 drinks of vodka daily and recently started on Vilazodone for depression.    In ED patient hemodynamically stable, tachycardic to 110s, lactate of 4.3, lipase of 30, alcoholic pattern of transaminitis and 10m dilated CBD on the CT scan. Surgery consulted and no surgical intervention warranted. Patient at time of interview continues to endorse 7/10 pain. 30 year old transgender male with past medical history of depression, anxiety, s/p hysterectomy s/p mastectomy presents with abdominal pain for 4 days.    Patient reports that he was in usual state of health 4 days ago when he suddenly developed an epigastric/umbilical pain "sharp burning" radiating to the left, initially 3/10 now 7/10, worse with food, motion, cough and improved with rest. Patients pain worsened over the next days and developed associated biliary vomiting consisting of food contents mainly and loss of appetite. Patient denies any fevers, chills, rigors, outside food intake, numbness, tingling, jaundice or any other complaints.    Of note drinks 10-12 drinks of vodka daily and recently started on Vilazodone for depression.    In ED patient hemodynamically stable, tachycardic to 110s, lactate of 4.3, lipase of 30, alcoholic pattern of transaminitis and 10m dilated CBD on the CT scan. Surgery consulted and no surgical intervention warranted. Patient at time of interview continues to endorse 7/10 pain.

## 2020-11-02 NOTE — H&P ADULT - ATTENDING COMMENTS
30 year old transgender male with past medical history of depression, anxiety, s/p hysterectomy s/p mastectomy presents with abdominal pain for 4 days. Pain in the epigastrium. Sharp, radiate to the left side and the back, worse with food. He has episode of biliarous vomiting, he denies fever, chills, diarrhea. He was drinking heavily recently about 12 shot of Vodka. In ED patient hemodynamically stable, tachycardic to 110s, lactate of 4.3, lipase of 30, alcoholic pattern of transaminitis and 10m dilated CBD on the CT scan.    PHYSICAL EXAM:  GENERAL: NAD, well-developed.  HEAD:  Atraumatic, Normocephalic.  EYES: EOMI, PERRLA, conjunctiva and sclera clear.  NECK: Supple, No JVD.  CHEST/LUNG: Clear to auscultation bilaterally; No wheeze.  HEART: Regular rate and rhythm; S1 S2.   ABDOMEN: Soft, mild epigastric tenderness, Pritchett's negative.   EXTREMITIES:  2+ Peripheral Pulses, No clubbing, cyanosis, or edema.  PSYCH: AAOx3.  NEUROLOGY: non-focal.  SKIN: No rashes or lesions.    A/P:   Abdominal pain  Likely alcoholic gastritis.   Abdomen CT showed diffuse hepatic steatosis. Dilated CBD  GI and surgery recommended   Continue PPI, IV fluid for now, clear liquid diet.     chronic alcoholism;   Monitor for alcohol withdrawal, start on CIWA protocol.     Suspected folate and thiamin deficiency;   start on folic acid and thiamin.

## 2020-11-02 NOTE — H&P ADULT - NSHPLABSRESULTS_GEN_ALL_CORE
14.5   3.86  )-----------( 168      ( 01 Nov 2020 14:02 )             41.9       11-01    137  |  98  |  19  ----------------------------<  93  4.3   |  23  |  0.9    Ca    9.2      01 Nov 2020 14:02    TPro  6.6  /  Alb  4.2  /  TBili  0.6  /  DBili  0.2  /  AST  217<H>  /  ALT  106<H>  /  AlkPhos  74  11-01              Urinalysis Basic - ( 01 Nov 2020 18:05 )    Color: Light Yellow / Appearance: Clear / SG: >1.050 / pH: x  Gluc: x / Ketone: Small  / Bili: Negative / Urobili: <2 mg/dL   Blood: x / Protein: Trace / Nitrite: Negative   Leuk Esterase: Negative / RBC: x / WBC x   Sq Epi: x / Non Sq Epi: x / Bacteria: x                  CAPILLARY BLOOD GLUCOSE

## 2020-11-02 NOTE — DISCHARGE NOTE PROVIDER - NSDCCPCAREPLAN_GEN_ALL_CORE_FT
PRINCIPAL DISCHARGE DIAGNOSIS  Diagnosis: Common bile duct dilatation  Assessment and Plan of Treatment: your evaluation included CT abdomen and MRCP showed mild dialtion of the bile duct with no evidence of stone or obstruction, currently you have no pain but you should follow up with surgery for removal of gallbladder      SECONDARY DISCHARGE DIAGNOSES  Diagnosis: Hepatic steatosis  Assessment and Plan of Treatment: this is liver injury related to alcohol intake, the best treatment is to stop alcohol drinking    Diagnosis: Alcohol use  Assessment and Plan of Treatment: you was advised to stop alcohol as it afects your health, inlcuding the liver,

## 2020-11-02 NOTE — H&P ADULT - NSHPSOCIALHISTORY_GEN_ALL_CORE
Smokes 5 cigarettes daily, drinks 10-12 drinks of vodka daily, denies any IVDU in last couple of years. Lives in basement with father living on the upper floor.

## 2020-11-02 NOTE — DISCHARGE NOTE PROVIDER - HOSPITAL COURSE
30 year old transgender male with alcohol abuse, depression, anxiety, s/p mastectomy presents with abdominal pain for 4 days.    Patient reports that he was in usual state of health 4 days ago when he suddenly developed an epigastric/umbilical pain "sharp burning" radiating to the left, Patients pain worsened over the next days and developed associated biliary vomiting consisting of food contents mainly and loss of appetite. Patient denies any fevers, chills, rigors, outside food intake, numbness, tingling, jaundice or any other complaints.  Of note drinks 10-12 drinks of vodka daily and recently started on Vilazodone for depression.    n ED patient hemodynamically stable, tachycardic to 110s, lactate of 4.3, lipase of 30, alcoholic pattern of transaminitis and 10m dilated CBD on the CT scan. Surgery consulted and no surgical intervention warranted. Patient underwent MRCP that showed CBD of 1 cm with evidence of stones or obstruction but +ve for hepatic steatosis,   pt was treated with IV hydration, folate and thiamine, pt feels well and has no pain, will be discharged and f/u as outpt with surgery and also with PCP for elevated LFTs.

## 2020-11-02 NOTE — DISCHARGE NOTE PROVIDER - CARE PROVIDER_API CALL
Toya Lyon  INTERNAL MEDICINE  [address] [place] [id number]  Phone: [phone]  Fax: [phone]  Follow Up Time: 1 week    Fawad Serna  SURGERY  [address] [place] [address], [address] [place] [address]  Phone: [phone]  Fax: [phone]  Follow Up Time: 1 week

## 2020-11-02 NOTE — H&P ADULT - NSHPPHYSICALEXAM_GEN_ALL_CORE
GENERAL: NAD, lying in bed comfortably  ENT: Moist mucous membranes  CHEST/LUNG: Clear to auscultation bilaterally; No rales, rhonchi, wheezing, or rubs  HEART: Regular rate and rhythm  ABDOMEN: Bowel sounds present; Tender to palpation in the umbilical and epigastric area and tenderness on the left flank, liver border felt at least 2-3 cm below costal margin  EXTREMITIES:  2+ Peripheral Pulses, brisk capillary refill. No clubbing, cyanosis, or edema  NERVOUS SYSTEM:  Alert & Oriented X3, speech clear. No deficits   MSK: FROM all 4 extremities, full and equal strength  SKIN: No rashes or lesions

## 2020-11-02 NOTE — CHART NOTE - NSCHARTNOTEFT_GEN_A_CORE
Patient seen and examined.   Reports improvement in pain, now 4/10    Focused Physical exam:   Heart: no murmurs appreciated  Lungs: clear, no wheezing  Abdomen: soft, epigastric tenderness + RUQ tenderness, non-distended  LE: no edema  Skin: no rashes or bites     30 year old transgender male with past medical history of depression, anxiety, s/p hysterectomy s/p mastectomy presents with abdominal pain for 4 days.  Admitted for gastritis  CT abdomen done: . New diffuse hepatic steatosis and dilated common bile duct. No signs of pancreatitis  US abdomen: CBD=10mm    # Abdominal Pain likely from Alcoholic Gastritis  # H/O Alcohol abuse  Drinks 10-12 vodka drinks daily and has h/o of gastritis  - Will cover with both Pantoprazole and Pepcid  - Gastroenterology consulted  - Will continue with LR hydration and trend lactate  - MRCP for CBD dilatation eval     # Alcoholic Steatosis - appreciated on CT abdomen  # Transaminitis Alcoholic Pattern  AST 200s and ALT 100s  - Patient counselled on alcohol cessation    # macrocytic anemia likely alcohol induced  - Will supplement with folate and B12  - f/u b12, folate    # Anxiety/Panic Attacks  # Depression  - Continue Klonopin 1 twice daily  - Vilazodone not available. Patient can bring or send non formulary in AM    # Smoking Cessation  - Nicotine 7 patch provided    DVT: Lovenox  GI: pantoprazole  Diet; low fat  Dispo: Acute Patient seen and examined.   Reports improvement in pain, now 4/10    Focused Physical exam:   Heart: no murmurs appreciated  Lungs: clear, no wheezing  Abdomen: soft, epigastric tenderness + RUQ tenderness, non-distended  LE: no edema  Skin: no rashes or bites     30 year old transgender male with past medical history of depression, anxiety, s/p hysterectomy s/p mastectomy presents with abdominal pain for 4 days.  Admitted for gastritis  CT abdomen done: . New diffuse hepatic steatosis and dilated common bile duct. No signs of pancreatitis.   US abdomen: CBD=10mm.   Lipase WNL    # Abdominal Pain likely from Alcoholic Gastritis vs cholelithiasis?   # H/O Alcohol abuse  Drinks 10-12 vodka drinks daily and has h/o of gastritis  - Will cover with both Pantoprazole and Pepcid  - Gastroenterology consulted  - Will continue with LR hydration and trend lactate  - MRCP for CBD dilatation eval     # Alcoholic Steatosis - appreciated on CT abdomen  # Transaminitis Alcoholic Pattern  AST 200s and ALT 100s  - Patient counselled on alcohol cessation    # macrocytic anemia likely alcohol induced  - Will supplement with folate and B12  - f/u b12, folate    # Anxiety/Panic Attacks  # Depression  - Continue Klonopin 1 twice daily  - Vilazodone not available. Patient can bring or send non formulary in AM    # Smoking Cessation  - Nicotine 7 patch provided    DVT: Lovenox  GI: pantoprazole  Diet; low fat  Dispo: Acute

## 2020-11-02 NOTE — SBIRT NOTE ADULT - NSSBIRTBRIEFINTDET_GEN_A_CORE
Screening results were reviewed with the patient and patient was provided information about healthy guidelines and potential negative consequences associated with level of risk. Motivation and readiness to reduce or stop use was discussed and goals and activities to make changes were suggested/offered.  Options discussed for further evaluation and treatment, but referral to treatment was not completed because patient is heavily engaged with AA/AA sponsor. Provided patient with contact info for telephonic SBIRT services.

## 2020-11-02 NOTE — CONSULT NOTE ADULT - ASSESSMENT
Patient is a 28 y/o with PMHx of depression, anxiety, GERD, s/p hysterectomy, s/p mastectomy whom presented with abdominal pain for 4 days. Pain started in epigastric/umbilical area, was described as "sharp burning" radiating to the left,  worse with food, motion, and cough. Pain was improved slightly with rest and was associated with emesis and nausea. Patient with significant ETOH use as of lately and reported 10-12 drinks of ETOH on a daily basis. Does note improvement of pain since admission. Patient with no elevations of T.Won or Alk phos but CBD around 1cm on imaging. Awaiting MRCP.    Abdominal Pain/ CBD dilation/ ETOH use/ Hepatosteatosis   - Await MRCP  - Clinically appears well currently  - IV hydration, with Thiamine MVI/ and folate   - Monitor for SnS of ETOH w/d  - Would obtain Hepatology serologies  - INR 1.05 and Platelet 137  - Will follow

## 2020-11-02 NOTE — DISCHARGE NOTE PROVIDER - NSDCMRMEDTOKEN_GEN_ALL_CORE_FT
cyanocobalamin 1000 mcg oral tablet: 1 tab(s) orally once a day  famotidine 20 mg oral tablet: 1 tab(s) orally once a day  famotidine 40 mg oral tablet: 1 tab(s) orally once a day (at bedtime)  folic acid 1 mg oral tablet: 1 tab(s) orally once a day  KlonoPIN 1 mg oral tablet: 1 tab(s) orally 2 times a day  Testosterone Cypionate 100 mg/mL intramuscular solution: 0.25 milliliter(s) intramuscular every 7 days (home med)  thiamine 100 mg oral tablet: 1 tab(s) orally once a day   vilazodone 10 mg oral tablet: 1 tab(s) orally once a day

## 2020-11-02 NOTE — H&P ADULT - ASSESSMENT
30 year old male with past medical history of depression, anxiety, s/p hysterectomy s/p mastectomy presents with abdominal pain for 4 days.    Patients abdominal pain description does not point towards a specific cause Could just be from dehydration, infection or likely gastritis from alcohol abuse. Will consult GI. Start PPI and Famotidine.    # Abdominal Pain likely from Alcoholic Gastritis  # H/O Alcohol abuse  - Drinks 10-12 vodka drinks daily and has h/o of gastritis  - Epigastric to umbilical pain, not consistent with gallbladder pain  - Tender to palpation on the epispastic umbilical and flank area(left)  - CT Abdomen did not show any signs of infection  - Will cover with both Pantoprazole and Pepcid  - Gastroenterology consulted  - Patient counselled on alcohol cessation  - Will continue with LR hydration and trend lactate    # Alcoholic Steatosis  # Transaminitis Alcoholic Pattern  - AST 200s and ALT 100s  - Alcohol cessation counselling done  - CBD dilation to 10mm  - Surgery consulted and no intervention  - Recommend MRCP which will get    # macrocytic anemia likely alcohol induced  - Will supplement with folate and B12    # Anxiety/Panic Attacks  # Depression  - Continue Klonopin 1 twice daily  - Vilazodone not available. Patient can bring or send non formulary in AM    # Smoking Cessation  - Nicotine 7 patch provided    DVT: Lovenox  GI: pantoprazole  Diet; low fat  Dispo: Acute 30 year old male with past medical history of depression, anxiety, s/p hysterectomy s/p mastectomy presents with abdominal pain for 4 days.    Patients abdominal pain description does not point towards a specific cause Could just be from dehydration, infection or likely gastritis from alcohol abuse. Will consult GI. Start PPI and Famotidine.    # Abdominal Pain likely from Alcoholic Gastritis  # H/O Alcohol abuse  - Drinks 10-12 vodka drinks daily and has h/o of gastritis  - Epigastric to umbilical pain, not consistent with gallbladder pain, could be lactic acidosis, gastritis related pain  - Tender to palpation on the epispastic umbilical and flank area(left)  - CT Abdomen did not show any signs of infection  - Will cover with both Pantoprazole and Pepcid  - Gastroenterology consulted  - Patient counselled on alcohol cessation  - Will continue with LR hydration and trend lactate    # Alcoholic Steatosis  # Transaminitis Alcoholic Pattern  - AST 200s and ALT 100s  - Steatosis on the Liver imaging  - Alcohol cessation counselling done  - CBD dilation to 10mm  - Surgery consulted and no intervention  - Recommend MRCP which will get    # macrocytic anemia likely alcohol induced  - Will supplement with folate and B12    # Anxiety/Panic Attacks  # Depression  - Continue Klonopin 1 twice daily  - Vilazodone not available. Patient can bring or send non formulary in AM    # Smoking Cessation  - Nicotine 7 patch provided    DVT: Lovenox  GI: pantoprazole  Diet; low fat  Dispo: Acute 30 year old male with past medical history of depression, anxiety, s/p hysterectomy s/p mastectomy presents with abdominal pain for 4 days.    Patients abdominal pain description does not point towards a specific cause Could just be from dehydration, infection or likely gastritis from alcohol abuse. Will consult GI. Start PPI and Famotidine.    # Abdominal Pain likely from Alcoholic Gastritis  # H/O Alcohol abuse  - Drinks 10-12 vodka drinks daily and has h/o of gastritis  - Epigastric to umbilical pain, not consistent with gallbladder pain, could be lactic acidosis, gastritis related pain  - Unlikely infectious without any fevers, chills, white count elevation  - Tender to palpation on the epispastic umbilical and flank area(left)  - CT Abdomen did not show any signs of infection  - Will cover with both Pantoprazole and Pepcid  - Gastroenterology consulted  - Patient counselled on alcohol cessation  - Will continue with LR hydration and trend lactate    # Alcoholic Steatosis  # Transaminitis Alcoholic Pattern  - AST 200s and ALT 100s  - Steatosis on the Liver imaging  - Alcohol cessation counselling done  - CBD dilation to 10mm  - Surgery consulted and no intervention  - Recommend MRCP which will get    # macrocytic anemia likely alcohol induced  - Will supplement with folate and B12    # Anxiety/Panic Attacks  # Depression  - Continue Klonopin 1 twice daily  - Vilazodone not available. Patient can bring or send non formulary in AM    # Smoking Cessation  - Nicotine 7 patch provided    DVT: Lovenox  GI: pantoprazole  Diet; low fat  Dispo: Acute

## 2020-11-02 NOTE — DISCHARGE NOTE NURSING/CASE MANAGEMENT/SOCIAL WORK - PATIENT PORTAL LINK FT
You can access the FollowMyHealth Patient Portal offered by [facility] by registering at the following website: [email or web address]. By joining [email or web address]’s FollowMyHealth portal, you will also be able to view your health information using other applications (apps) compatible with our system.

## 2020-11-02 NOTE — DISCHARGE NOTE PROVIDER - PROVIDER TOKENS
PROVIDER:[TOKEN:[[id number]],FOLLOWUP:[1 week]],PROVIDER:[TOKEN:[[id number]],FOLLOWUP:[1 week]]

## 2020-11-02 NOTE — CONSULT NOTE ADULT - SUBJECTIVE AND OBJECTIVE BOX
Patient is a 28 y/o with PMHx of depression, anxiety, GERD, s/p hysterectomy, s/p mastectomy whom presented with abdominal pain for 4 days. Pain started in epigastric/umbilical area, was described as "sharp burning" radiating to the left,  worse with food, motion, and cough. Pain was improved slightly with rest and was associated with emesis and nausea. Patient with significant ETOH use as of lately and reported 10-12 drinks of ETOH on a daily basis. Does note improvement of pain since admission.         MEDICATIONS  (STANDING):  chlorhexidine 4% Liquid 1 Application(s) Topical once  clonazePAM  Tablet 1 milliGRAM(s) Oral two times a day  cyanocobalamin 1000 MICROGram(s) Oral daily  enoxaparin Injectable 40 milliGRAM(s) SubCutaneous daily  famotidine    Tablet 40 milliGRAM(s) Oral at bedtime  folic acid 1 milliGRAM(s) Oral daily  lactated ringers. 1000 milliLiter(s) (150 mL/Hr) IV Continuous <Continuous>  magnesium sulfate  IVPB 2 Gram(s) IV Intermittent once  nicotine -   7 mG/24Hr(s) Patch 1 patch Transdermal daily        Allergies  fish (Other)  iodine (Other)    Review of Systems  General: See HPI  HEENT: Denies Trouble Swallowing ,Denies  Sore Throat , Denies Change in hearing/vision/speech ,Denies Dizziness    Cardio: Denies  Chest Pain , Palpitations    Respiratory: Denies worsening of SOB, Denies Cough  Abdomen: See detailed HPI  Neuro: Denies Headache Denies Dizziness, Denies Paresthesias  MSK: Denies pain in Bones/Joints/Muscles   Psych: Patient denies depression, denies suicidal or homicidal ideations  Integ: Patient Denies rash, or new skin lesions       Vital Signs  T(F): 97.7 (02 Nov 2020 07:44), Max: 98.6 (01 Nov 2020 13:10)  HR: 82 (02 Nov 2020 07:44) (65 - 112)  BP: 113/56 (02 Nov 2020 07:44) (111/56 - 142/81)  RR: 18 (02 Nov 2020 07:44) (18 - 18)  SpO2: 99% (02 Nov 2020 07:44) (97% - 99%)  Physical Exam  Gen: NAD  HEENT: NC/AT, Mucosal Membranes Moist   Cardio: S1/S2   Resp: CTA B/L  Abdomen: Soft, ND/NT  Neuro: AAOx3  Extremities: FROM x 4      LABS:                        13.2   3.98  )-----------( 137      ( 02 Nov 2020 05:31 )             38.8     11-02    138  |  98  |  14  ----------------------------<  80  4.3   |  27  |  1.0    Ca    9.1      02 Nov 2020 05:31  Phos  4.3     11-02  Mg     1.5     11-02    TPro  5.8<L>  /  Alb  3.8  /  TBili  0.9  /  DBili  x   /  AST  157<H>  /  ALT  86<H>  /  AlkPhos  65  11-02    Lactate: 1.1      RADIOLOGY & ADDITIONAL STUDIES:  US Abdomen Limited 11.01.20  IMPRESSION:  Hepatic steatosis.    Dilated common bile duct measuring 10 mm.      CT Abdomen and Pelvis w/ IV Cont 11.01.20   IMPRESSION:    Since September 17, 2015;    1. New diffuse hepatic steatosis and dilated common bile duct    2. Status post hysterectomy.

## 2020-11-03 LAB
-  AMIKACIN: SIGNIFICANT CHANGE UP
-  AMOXICILLIN/CLAVULANIC ACID: SIGNIFICANT CHANGE UP
-  AMPICILLIN/SULBACTAM: SIGNIFICANT CHANGE UP
-  AMPICILLIN: SIGNIFICANT CHANGE UP
-  AZTREONAM: SIGNIFICANT CHANGE UP
-  CEFAZOLIN: SIGNIFICANT CHANGE UP
-  CEFEPIME: SIGNIFICANT CHANGE UP
-  CEFOXITIN: SIGNIFICANT CHANGE UP
-  CEFTRIAXONE: SIGNIFICANT CHANGE UP
-  CIPROFLOXACIN: SIGNIFICANT CHANGE UP
-  ERTAPENEM: SIGNIFICANT CHANGE UP
-  GENTAMICIN: SIGNIFICANT CHANGE UP
-  LEVOFLOXACIN: SIGNIFICANT CHANGE UP
-  MEROPENEM: SIGNIFICANT CHANGE UP
-  NITROFURANTOIN: SIGNIFICANT CHANGE UP
-  PIPERACILLIN/TAZOBACTAM: SIGNIFICANT CHANGE UP
-  TOBRAMYCIN: SIGNIFICANT CHANGE UP
-  TRIMETHOPRIM/SULFAMETHOXAZOLE: SIGNIFICANT CHANGE UP
ALBUMIN SERPL ELPH-MCNC: 4 G/DL — SIGNIFICANT CHANGE UP (ref 3.3–5)
ALP SERPL-CCNC: 69 U/L — SIGNIFICANT CHANGE UP (ref 40–120)
ALT FLD-CCNC: 86 U/L — HIGH (ref 10–45)
AST SERPL-CCNC: 158 U/L — HIGH (ref 10–40)
BILIRUB DIRECT SERPL-MCNC: 0.2 MG/DL — SIGNIFICANT CHANGE UP (ref 0–0.2)
BILIRUB INDIRECT FLD-MCNC: 0.5 MG/DL — SIGNIFICANT CHANGE UP (ref 0.2–1.2)
BILIRUB SERPL-MCNC: 0.8 MG/DL — SIGNIFICANT CHANGE UP (ref 0.2–1.2)
CULTURE RESULTS: SIGNIFICANT CHANGE UP
FOLATE SERPL-MCNC: 12.6 NG/ML — SIGNIFICANT CHANGE UP
HAV IGM SER-ACNC: SIGNIFICANT CHANGE UP
HBV CORE IGM SER-ACNC: SIGNIFICANT CHANGE UP
HBV SURFACE AG SER-ACNC: SIGNIFICANT CHANGE UP
HCV AB S/CO SERPL IA: 0.13 S/CO — SIGNIFICANT CHANGE UP (ref 0–0.99)
HCV AB SERPL-IMP: SIGNIFICANT CHANGE UP
METHOD TYPE: SIGNIFICANT CHANGE UP
ORGANISM # SPEC MICROSCOPIC CNT: SIGNIFICANT CHANGE UP
ORGANISM # SPEC MICROSCOPIC CNT: SIGNIFICANT CHANGE UP
PROT SERPL-MCNC: 5.7 G/DL — LOW (ref 6–8.3)
SPECIMEN SOURCE: SIGNIFICANT CHANGE UP

## 2020-11-06 DIAGNOSIS — E51.9 THIAMINE DEFICIENCY, UNSPECIFIED: ICD-10-CM

## 2020-11-06 DIAGNOSIS — F17.210 NICOTINE DEPENDENCE, CIGARETTES, UNCOMPLICATED: ICD-10-CM

## 2020-11-06 DIAGNOSIS — K21.9 GASTRO-ESOPHAGEAL REFLUX DISEASE WITHOUT ESOPHAGITIS: ICD-10-CM

## 2020-11-06 DIAGNOSIS — F41.0 PANIC DISORDER [EPISODIC PAROXYSMAL ANXIETY]: ICD-10-CM

## 2020-11-06 DIAGNOSIS — F32.9 MAJOR DEPRESSIVE DISORDER, SINGLE EPISODE, UNSPECIFIED: ICD-10-CM

## 2020-11-06 DIAGNOSIS — Z91.013 ALLERGY TO SEAFOOD: ICD-10-CM

## 2020-11-06 DIAGNOSIS — F10.10 ALCOHOL ABUSE, UNCOMPLICATED: ICD-10-CM

## 2020-11-06 DIAGNOSIS — F19.11 OTHER PSYCHOACTIVE SUBSTANCE ABUSE, IN REMISSION: ICD-10-CM

## 2020-11-06 DIAGNOSIS — R10.13 EPIGASTRIC PAIN: ICD-10-CM

## 2020-11-06 DIAGNOSIS — Z91.09 OTHER ALLERGY STATUS, OTHER THAN TO DRUGS AND BIOLOGICAL SUBSTANCES: ICD-10-CM

## 2020-11-06 DIAGNOSIS — K70.0 ALCOHOLIC FATTY LIVER: ICD-10-CM

## 2020-11-06 DIAGNOSIS — E53.8 DEFICIENCY OF OTHER SPECIFIED B GROUP VITAMINS: ICD-10-CM

## 2020-11-06 DIAGNOSIS — K83.8 OTHER SPECIFIED DISEASES OF BILIARY TRACT: ICD-10-CM

## 2020-11-06 DIAGNOSIS — D53.9 NUTRITIONAL ANEMIA, UNSPECIFIED: ICD-10-CM

## 2020-12-16 PROBLEM — Z87.440 HISTORY OF URINARY TRACT INFECTION: Status: RESOLVED | Noted: 2018-11-11 | Resolved: 2020-12-16

## 2021-10-27 ENCOUNTER — EMERGENCY (EMERGENCY)
Facility: HOSPITAL | Age: 31
LOS: 0 days | Discharge: HOME | End: 2021-10-27
Attending: EMERGENCY MEDICINE | Admitting: EMERGENCY MEDICINE
Payer: COMMERCIAL

## 2021-10-27 VITALS
HEART RATE: 88 BPM | OXYGEN SATURATION: 99 % | SYSTOLIC BLOOD PRESSURE: 137 MMHG | RESPIRATION RATE: 18 BRPM | DIASTOLIC BLOOD PRESSURE: 89 MMHG | TEMPERATURE: 97 F | WEIGHT: 164.91 LBS

## 2021-10-27 DIAGNOSIS — S66.022A LACERATION OF LONG FLEXOR MUSCLE, FASCIA AND TENDON OF LEFT THUMB AT WRIST AND HAND LEVEL, INITIAL ENCOUNTER: ICD-10-CM

## 2021-10-27 DIAGNOSIS — S61.012A LACERATION WITHOUT FOREIGN BODY OF LEFT THUMB WITHOUT DAMAGE TO NAIL, INITIAL ENCOUNTER: ICD-10-CM

## 2021-10-27 DIAGNOSIS — Z91.041 RADIOGRAPHIC DYE ALLERGY STATUS: ICD-10-CM

## 2021-10-27 DIAGNOSIS — Z91.013 ALLERGY TO SEAFOOD: ICD-10-CM
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
## 2021-10-27 PROCEDURE — 99284 EMERGENCY DEPT VISIT MOD MDM: CPT

## 2021-10-27 NOTE — ED PROVIDER NOTE - NS ED ROS FT
Constitutional: (-) fever (-) malaise (-) diaphoresis (-) chills (-) wt. loss (-) body aches (-) night sweats  Eyes: (-) visual changes (-) eye pain (-) eye discharge (-) photophobia (-) FB sensation  Cardiac: (-) chest pain  (-) palpitations (-) syncope (-) edema  Respiratory: (-) cough (-) SOB (-) HUDSON  GI: (-) nausea (-) vomiting (-) diarrhea (-) abdominal pain   Neuro: (-) headache (-) dizziness (-) numbness/tingling to extremities B/L (-) weakness   Skin: (-) rash (+) laceration    Except as documented in the HPI, all other systems are negative.

## 2021-10-27 NOTE — ED PROVIDER NOTE - PROGRESS NOTE DETAILS
NC: Dr tobias consulted, would like pt to follow up in office in a few hours. NC: Dr tobias consulted, would like pt to follow up in office in a few hours. Will apply the dressing and refer to ortho office for an appt later today. Pt agreed w the plan.

## 2021-10-27 NOTE — ED PROVIDER NOTE - CLINICAL SUMMARY MEDICAL DECISION MAKING FREE TEXT BOX
Refered to [facility] office for an appt today w Dr. Mathew. Full DC instructions discussed and patient knows when to seek immediate medical attention. Patient has proper follow-up. All results discussed with the patient they may require further work-up. Limitations of ED work-up discussed. All  questions and concerns from patient or family addressed. Understanding of insturctions verbalized

## 2021-10-27 NOTE — ED PROVIDER NOTE - NSFOLLOWUPINSTRUCTIONS_ED_ALL_ED_FT
**Follow up with Dr Mathew**    Laceration    A laceration is a cut that goes through all of the layers of the skin and into the tissue that is right under the skin. Some lacerations heal on their own. Others need to be closed with skin adhesive strips, skin glue, stitches (sutures), or staples. Proper laceration care minimizes the risk of infection and helps the laceration to heal better.  If non-absorbable stitches or staples have been placed, they must be taken out within the time frame instructed by your healthcare provider.    SEEK IMMEDIATE MEDICAL CARE IF YOU HAVE ANY OF THE FOLLOWING SYMPTOMS: swelling around the wound, worsening pain, drainage from the wound, red streaking going away from your wound, inability to move finger or toe near the laceration, or discoloration of skin near the laceration.

## 2021-10-27 NOTE — ED PROVIDER NOTE - CARE PROVIDER_API CALL
Jeff Mathew  PLASTIC SURGERY  [address] [place] [address]  Phone: [phone]  Fax: [phone]  Follow Up Time: 1-3 Days

## 2021-10-27 NOTE — ED PROVIDER NOTE - OBJECTIVE STATEMENT
32 yo M no pmhx, TDAP utd presenting to the ED for evaluation of laceration to L thumb about 2 hours prior to arrival, pt was cutting vegetables and knife slipped. Pt reports mild pain localized to the area of laceration, pt reports difficulty flexing thumb after injury. Pt admits to weakness with flexion. Denies any numbness/tingling.

## 2021-10-27 NOTE — ED PROVIDER NOTE - PHYSICAL EXAMINATION
GENERAL: Well-nourished, Well-developed. NAD.  HEAD: No visible or palpable bumps or hematomas. No ecchymosis behind ears B/L.  Eyes: PERRLA, EOMI. No asymmetry. No nystagmus. No conjunctival injection. Non-icteric sclera.  ENMT: MMM.   Neck: Supple. FROM  CVS: RRR  Skin: (+)2cm linear laceration with visible tendon, L DIP, inability to fully flex.   EXT: Radial and pedal pulses present B/L. No calf tenderness or swelling B/L. No palpable cords. No pedal edema B/L.  Neuro: NVI

## 2021-10-27 NOTE — ED PROVIDER NOTE - ATTENDING CONTRIBUTION TO CARE
I personally evaluated the patient. I reviewed the Resident’s or Physician Assistant’s note (as assigned above), and agree with the findings and plan except as documented in my note.    30 yo M no pmhx, TDAP utd presenting to the ED for evaluation of laceration to L thumb about 2 hours prior to arrival, pt was cutting vegetables and knife slipped. Pt w left flexor tendon involvement on careful wound exam. Plan- is hand surgery consult, reassess

## 2021-10-27 NOTE — ED PROVIDER NOTE - PATIENT PORTAL LINK FT
You can access the FollowMyHealth Patient Portal offered by [facility] by registering at the following website: [email or web address]. By joining [email or web address]’s FollowMyHealth portal, you will also be able to view your health information using other applications (apps) compatible with our system.

## 2022-07-10 ENCOUNTER — EMERGENCY (EMERGENCY)
Facility: HOSPITAL | Age: 32
LOS: 0 days | Discharge: LEFT BEFORE DISCHARGE | End: 2022-07-10
Attending: EMERGENCY MEDICINE | Admitting: EMERGENCY MEDICINE

## 2022-07-10 VITALS
OXYGEN SATURATION: 98 % | DIASTOLIC BLOOD PRESSURE: 67 MMHG | TEMPERATURE: 97 F | SYSTOLIC BLOOD PRESSURE: 130 MMHG | HEART RATE: 82 BPM | RESPIRATION RATE: 18 BRPM

## 2022-07-10 DIAGNOSIS — R10.11 RIGHT UPPER QUADRANT PAIN: ICD-10-CM

## 2022-07-10 DIAGNOSIS — Z90.13 ACQUIRED ABSENCE OF BILATERAL BREASTS AND NIPPLES: ICD-10-CM

## 2022-07-10 DIAGNOSIS — R07.2 PRECORDIAL PAIN: ICD-10-CM

## 2022-07-10 DIAGNOSIS — M79.605 PAIN IN LEFT LEG: ICD-10-CM

## 2022-07-10 DIAGNOSIS — R19.7 DIARRHEA, UNSPECIFIED: ICD-10-CM

## 2022-07-10 DIAGNOSIS — E83.42 HYPOMAGNESEMIA: ICD-10-CM

## 2022-07-10 DIAGNOSIS — M79.604 PAIN IN RIGHT LEG: ICD-10-CM

## 2022-07-10 DIAGNOSIS — Z88.8 ALLERGY STATUS TO OTHER DRUGS, MEDICAMENTS AND BIOLOGICAL SUBSTANCES STATUS: ICD-10-CM

## 2022-07-10 DIAGNOSIS — Z91.013 ALLERGY TO SEAFOOD: ICD-10-CM

## 2022-07-10 DIAGNOSIS — F10.10 ALCOHOL ABUSE, UNCOMPLICATED: ICD-10-CM

## 2022-07-10 DIAGNOSIS — Z53.29 PROCEDURE AND TREATMENT NOT CARRIED OUT BECAUSE OF PATIENT'S DECISION FOR OTHER REASONS: ICD-10-CM

## 2022-07-10 LAB
ALBUMIN SERPL ELPH-MCNC: 4.7 G/DL — SIGNIFICANT CHANGE UP (ref 3.5–5.2)
ALP SERPL-CCNC: 174 U/L — HIGH (ref 30–115)
ALT FLD-CCNC: 141 U/L — HIGH (ref 0–41)
ANION GAP SERPL CALC-SCNC: 21 MMOL/L — HIGH (ref 7–14)
AST SERPL-CCNC: 272 U/L — HIGH (ref 0–41)
BASOPHILS # BLD AUTO: 0.04 K/UL — SIGNIFICANT CHANGE UP (ref 0–0.2)
BASOPHILS NFR BLD AUTO: 0.8 % — SIGNIFICANT CHANGE UP (ref 0–1)
BILIRUB SERPL-MCNC: 1.4 MG/DL — HIGH (ref 0.2–1.2)
BUN SERPL-MCNC: 8 MG/DL — LOW (ref 10–20)
CALCIUM SERPL-MCNC: 9.8 MG/DL — SIGNIFICANT CHANGE UP (ref 8.5–10.1)
CHLORIDE SERPL-SCNC: 90 MMOL/L — LOW (ref 98–110)
CK SERPL-CCNC: 270 U/L — HIGH (ref 0–225)
CO2 SERPL-SCNC: 24 MMOL/L — SIGNIFICANT CHANGE UP (ref 17–32)
CREAT SERPL-MCNC: 0.7 MG/DL — SIGNIFICANT CHANGE UP (ref 0.7–1.5)
EGFR: 125 ML/MIN/1.73M2 — SIGNIFICANT CHANGE UP
EOSINOPHIL # BLD AUTO: 0.04 K/UL — SIGNIFICANT CHANGE UP (ref 0–0.7)
EOSINOPHIL NFR BLD AUTO: 0.8 % — SIGNIFICANT CHANGE UP (ref 0–8)
GLUCOSE SERPL-MCNC: 109 MG/DL — HIGH (ref 70–99)
HCT VFR BLD CALC: 38.4 % — LOW (ref 42–52)
HGB BLD-MCNC: 12.9 G/DL — LOW (ref 14–18)
IMM GRANULOCYTES NFR BLD AUTO: 0.6 % — HIGH (ref 0.1–0.3)
LACTATE SERPL-SCNC: 5.6 MMOL/L — CRITICAL HIGH (ref 0.7–2)
LIDOCAIN IGE QN: 39 U/L — SIGNIFICANT CHANGE UP (ref 7–60)
LYMPHOCYTES # BLD AUTO: 0.88 K/UL — LOW (ref 1.2–3.4)
LYMPHOCYTES # BLD AUTO: 18.6 % — LOW (ref 20.5–51.1)
MAGNESIUM SERPL-MCNC: 1.6 MG/DL — LOW (ref 1.8–2.4)
MCHC RBC-ENTMCNC: 33.6 G/DL — SIGNIFICANT CHANGE UP (ref 32–37)
MCHC RBC-ENTMCNC: 35.1 PG — HIGH (ref 27–31)
MCV RBC AUTO: 104.3 FL — HIGH (ref 80–94)
MONOCYTES # BLD AUTO: 0.57 K/UL — SIGNIFICANT CHANGE UP (ref 0.1–0.6)
MONOCYTES NFR BLD AUTO: 12.1 % — HIGH (ref 1.7–9.3)
NEUTROPHILS # BLD AUTO: 3.17 K/UL — SIGNIFICANT CHANGE UP (ref 1.4–6.5)
NEUTROPHILS NFR BLD AUTO: 67.1 % — SIGNIFICANT CHANGE UP (ref 42.2–75.2)
NRBC # BLD: 0 /100 WBCS — SIGNIFICANT CHANGE UP (ref 0–0)
PLATELET # BLD AUTO: 176 K/UL — SIGNIFICANT CHANGE UP (ref 130–400)
POTASSIUM SERPL-MCNC: 4.5 MMOL/L — SIGNIFICANT CHANGE UP (ref 3.5–5)
POTASSIUM SERPL-SCNC: 4.5 MMOL/L — SIGNIFICANT CHANGE UP (ref 3.5–5)
PROT SERPL-MCNC: 7.1 G/DL — SIGNIFICANT CHANGE UP (ref 6–8)
RBC # BLD: 3.68 M/UL — LOW (ref 4.7–6.1)
RBC # FLD: 13.3 % — SIGNIFICANT CHANGE UP (ref 11.5–14.5)
SODIUM SERPL-SCNC: 135 MMOL/L — SIGNIFICANT CHANGE UP (ref 135–146)
TROPONIN T SERPL-MCNC: <0.01 NG/ML — SIGNIFICANT CHANGE UP
WBC # BLD: 4.73 K/UL — LOW (ref 4.8–10.8)
WBC # FLD AUTO: 4.73 K/UL — LOW (ref 4.8–10.8)

## 2022-07-10 PROCEDURE — 99285 EMERGENCY DEPT VISIT HI MDM: CPT

## 2022-07-10 PROCEDURE — 93010 ELECTROCARDIOGRAM REPORT: CPT

## 2022-07-10 PROCEDURE — 76705 ECHO EXAM OF ABDOMEN: CPT | Mod: 26

## 2022-07-10 RX ORDER — THIAMINE MONONITRATE (VIT B1) 100 MG
500 TABLET ORAL ONCE
Refills: 0 | Status: DISCONTINUED | OUTPATIENT
Start: 2022-07-10 | End: 2022-07-10

## 2022-07-10 RX ORDER — MAGNESIUM SULFATE 500 MG/ML
2 VIAL (ML) INJECTION ONCE
Refills: 0 | Status: DISCONTINUED | OUTPATIENT
Start: 2022-07-10 | End: 2022-07-10

## 2022-07-10 RX ORDER — KETOROLAC TROMETHAMINE 30 MG/ML
15 SYRINGE (ML) INJECTION ONCE
Refills: 0 | Status: COMPLETED | OUTPATIENT
Start: 2022-07-10 | End: 2022-07-10

## 2022-07-10 RX ORDER — FAMOTIDINE 10 MG/ML
20 INJECTION INTRAVENOUS ONCE
Refills: 0 | Status: DISCONTINUED | OUTPATIENT
Start: 2022-07-10 | End: 2022-07-10

## 2022-07-10 RX ORDER — SODIUM CHLORIDE 9 MG/ML
1000 INJECTION INTRAMUSCULAR; INTRAVENOUS; SUBCUTANEOUS ONCE
Refills: 0 | Status: COMPLETED | OUTPATIENT
Start: 2022-07-10 | End: 2022-07-10

## 2022-07-10 RX ORDER — SODIUM CHLORIDE 9 MG/ML
1000 INJECTION, SOLUTION INTRAVENOUS ONCE
Refills: 0 | Status: DISCONTINUED | OUTPATIENT
Start: 2022-07-10 | End: 2022-07-10

## 2022-07-10 RX ADMIN — SODIUM CHLORIDE 1000 MILLILITER(S): 9 INJECTION INTRAMUSCULAR; INTRAVENOUS; SUBCUTANEOUS at 14:20

## 2022-07-10 NOTE — ED ADULT NURSE REASSESSMENT NOTE - NS ED NURSE REASSESS COMMENT FT1
Request pt to put mask on elderly pt in room uncomfortable with him having no mask .  Pt called my a "fucking cunt"

## 2022-07-10 NOTE — ED ADULT TRIAGE NOTE - PAIN RATING/NUMBER SCALE (0-10): ACTIVITY
Bedside and Verbal shift change report given to self (oncoming nurse) by Dick Lynch RN (offgoing nurse). Report included the following information SBAR, Kardex and MAR. L subclavian site bruised and swollen. LUE in sling. Pt paced on monitor, no visible signs of distress, no complaints of chest pain/shortness of breath. Spoke with MD Rolando Benavidez, no plans for procedure today r/t INR. Received verbal order for Cardiac diet effective now. No other orders at this time, will continue to monitor.
10
5

## 2022-07-10 NOTE — ED ADULT NURSE NOTE - NS ED NURSE ELOPE COMMENTS
Pt became verbally aggressive and removed his cardiac leads and IVL. PT cursed and screamed profanities related to wearing a mask. Pt was unable to be spoken to or redirected. MD Neil notified.

## 2022-07-10 NOTE — ED PROVIDER NOTE - OBJECTIVE STATEMENT
32-year-old female to male transgender patient with history of hysterectomy bilateral mastectomy presents to the emergency department for chest pain, right upper quadrant abdominal pain and bilateral leg pain.  Patient states that over the past 2 days he has been having right upper quadrant abdominal pain that waxes and wanes, but grows more severe just prior to having a bowel movement.  Patient states that then he begins to have burning diarrhea.  Diarrhea is nonbloody loose watery.  Patient also reports bilateral throbbing cramping leg pain.  Pain has been constant and states that it is much worse when he walks.  Denies any trauma injuries or falls.  Patient also complaining of midsternal chest discomfort which has been constant not worsened by anything and has been going on for the past day.  Patient has history of alcohol abuse typically drinks 10-12 shots of liquor per day, last drink was about 1 hour ago.  Denies fever chills cough shortness of breath palpitations abdominal pain vomiting dizziness lightheadedness weakness or syncope

## 2022-07-10 NOTE — ED PROVIDER NOTE - CARE PLAN
1 Principal Discharge DX:	Leg pain  Secondary Diagnosis:	Abdominal pain  Secondary Diagnosis:	Hypomagnesemia

## 2022-07-10 NOTE — ED PROVIDER NOTE - NS ED ROS FT
CONST: No fever, chills or bodyaches  EYES: No pain, redness, drainage or visual changes.  ENT: No ear pain or discharge, nasal discharge or congestion. No sore throat  CARD: Midsternal chest pain no palpitations  RESP: No SOB, cough, hemoptysis. No hx of asthma or COPD  GI: Upper abdominal pain with diarrhea no vomiting  : No urinary symptoms  MS: Bilateral lower extremity pain no swelling  SKIN: No rashes  NEURO: No headache, dizziness, paresthesias or LOC
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
## 2022-07-10 NOTE — ED PROVIDER NOTE - ATTENDING APP SHARED VISIT CONTRIBUTION OF CARE
31 yo transgender F to M, h/o alcohol abuse presents with b/l LE pain x few days.  Pt had outpatient Duplex which was negative for DVT.  Today pain to legs worse.  Also with abd pain, multiple episodes of loose stool, vomiting and poor po intake.  Last drink was 2 hrs ago. no fevers or chills. On exam pt in NAD AAO x 3, Lungs cta b/l no wrr, Op clear, dry MM, abd soft nd, + tender upper abdomen, no rash, no edema, no calf tenderness

## 2022-07-10 NOTE — ED PROVIDER NOTE - PHYSICAL EXAMINATION
CONST: Well appearing in NAD  EYES: PERRL, EOMI, Sclera and conjunctiva clear. Vision grossly intact  ENT: No nasal discharge. TM's clear B/L without drainage. Oropharynx normal appearing, no erythema or exudates. Uvula midline.  NECK: Non-tender, no meningeal signs  CARD: Normal S1 S2; Normal rate and rhythm  RESP: Equal BS B/L, No wheezes, rhonchi or rales. No distress  GI: Right upper quadrant tenderness, epigastric tenderness soft no rebound or guarding  MS: Normal ROM in all extremities. No midline spinal tenderness. Distal pulses intact  SKIN: Warm, dry, no acute rashes. Good turgor  NEURO: A&Ox3, No focal deficits. Strength 5/5 with no sensory deficits. Steady gait

## 2022-07-10 NOTE — ED PROVIDER NOTE - CLINICAL SUMMARY MEDICAL DECISION MAKING FREE TEXT BOX
Labs and imaging obtained.  Symptoms treated, Magnesium repleted.  Informed by RN that pt was upset about having to wear a mask and pulled out IV and left

## 2022-12-09 ENCOUNTER — EMERGENCY (EMERGENCY)
Facility: HOSPITAL | Age: 32
LOS: 0 days | Discharge: HOME | End: 2022-12-09
Attending: EMERGENCY MEDICINE | Admitting: EMERGENCY MEDICINE
Payer: MEDICAID

## 2022-12-09 VITALS
OXYGEN SATURATION: 100 % | DIASTOLIC BLOOD PRESSURE: 53 MMHG | RESPIRATION RATE: 12 BRPM | SYSTOLIC BLOOD PRESSURE: 109 MMHG | HEART RATE: 75 BPM

## 2022-12-09 VITALS
DIASTOLIC BLOOD PRESSURE: 51 MMHG | OXYGEN SATURATION: 60 % | TEMPERATURE: 98 F | HEART RATE: 124 BPM | SYSTOLIC BLOOD PRESSURE: 102 MMHG | RESPIRATION RATE: 8 BRPM

## 2022-12-09 DIAGNOSIS — Z91.013 ALLERGY TO SEAFOOD: ICD-10-CM
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
## 2022-12-09 DIAGNOSIS — Z88.8 ALLERGY STATUS TO OTHER DRUGS, MEDICAMENTS AND BIOLOGICAL SUBSTANCES STATUS: ICD-10-CM

## 2022-12-09 DIAGNOSIS — T40.2X1A POISONING BY OTHER OPIOIDS, ACCIDENTAL (UNINTENTIONAL), INITIAL ENCOUNTER: ICD-10-CM
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
## 2022-12-09 DIAGNOSIS — M25.571 PAIN IN RIGHT ANKLE AND JOINTS OF RIGHT FOOT: ICD-10-CM

## 2022-12-09 DIAGNOSIS — S82.851A DISPLACED TRIMALLEOLAR FRACTURE OF RIGHT LOWER LEG, INITIAL ENCOUNTER FOR CLOSED FRACTURE: ICD-10-CM

## 2022-12-09 LAB
ALBUMIN SERPL ELPH-MCNC: 4.4 G/DL — SIGNIFICANT CHANGE UP (ref 3.5–5.2)
ALP SERPL-CCNC: 56 U/L — SIGNIFICANT CHANGE UP (ref 30–115)
ALT FLD-CCNC: 30 U/L — SIGNIFICANT CHANGE UP (ref 0–41)
ANION GAP SERPL CALC-SCNC: 8 MMOL/L — SIGNIFICANT CHANGE UP (ref 7–14)
AST SERPL-CCNC: 29 U/L — SIGNIFICANT CHANGE UP (ref 0–41)
BASOPHILS # BLD AUTO: 0.04 K/UL — SIGNIFICANT CHANGE UP (ref 0–0.2)
BASOPHILS NFR BLD AUTO: 0.5 % — SIGNIFICANT CHANGE UP (ref 0–1)
BILIRUB SERPL-MCNC: 0.3 MG/DL — SIGNIFICANT CHANGE UP (ref 0.2–1.2)
BUN SERPL-MCNC: 11 MG/DL — SIGNIFICANT CHANGE UP (ref 10–20)
CALCIUM SERPL-MCNC: 9.5 MG/DL — SIGNIFICANT CHANGE UP (ref 8.4–10.5)
CHLORIDE SERPL-SCNC: 98 MMOL/L — SIGNIFICANT CHANGE UP (ref 98–110)
CO2 SERPL-SCNC: 27 MMOL/L — SIGNIFICANT CHANGE UP (ref 17–32)
CREAT SERPL-MCNC: 0.7 MG/DL — SIGNIFICANT CHANGE UP (ref 0.7–1.5)
EGFR: 125 ML/MIN/1.73M2 — SIGNIFICANT CHANGE UP
EOSINOPHIL # BLD AUTO: 0.15 K/UL — SIGNIFICANT CHANGE UP (ref 0–0.7)
EOSINOPHIL NFR BLD AUTO: 1.7 % — SIGNIFICANT CHANGE UP (ref 0–8)
GLUCOSE SERPL-MCNC: 112 MG/DL — HIGH (ref 70–99)
HCT VFR BLD CALC: 37.2 % — LOW (ref 42–52)
HGB BLD-MCNC: 12.7 G/DL — LOW (ref 14–18)
IMM GRANULOCYTES NFR BLD AUTO: 0.1 % — SIGNIFICANT CHANGE UP (ref 0.1–0.3)
LYMPHOCYTES # BLD AUTO: 2.34 K/UL — SIGNIFICANT CHANGE UP (ref 1.2–3.4)
LYMPHOCYTES # BLD AUTO: 26.4 % — SIGNIFICANT CHANGE UP (ref 20.5–51.1)
MCHC RBC-ENTMCNC: 32.4 PG — HIGH (ref 27–31)
MCHC RBC-ENTMCNC: 34.1 G/DL — SIGNIFICANT CHANGE UP (ref 32–37)
MCV RBC AUTO: 94.9 FL — HIGH (ref 80–94)
MONOCYTES # BLD AUTO: 0.74 K/UL — HIGH (ref 0.1–0.6)
MONOCYTES NFR BLD AUTO: 8.4 % — SIGNIFICANT CHANGE UP (ref 1.7–9.3)
NEUTROPHILS # BLD AUTO: 5.58 K/UL — SIGNIFICANT CHANGE UP (ref 1.4–6.5)
NEUTROPHILS NFR BLD AUTO: 62.9 % — SIGNIFICANT CHANGE UP (ref 42.2–75.2)
NRBC # BLD: 0 /100 WBCS — SIGNIFICANT CHANGE UP (ref 0–0)
PLATELET # BLD AUTO: 205 K/UL — SIGNIFICANT CHANGE UP (ref 130–400)
POTASSIUM SERPL-MCNC: 4.8 MMOL/L — SIGNIFICANT CHANGE UP (ref 3.5–5)
POTASSIUM SERPL-SCNC: 4.8 MMOL/L — SIGNIFICANT CHANGE UP (ref 3.5–5)
PROT SERPL-MCNC: 7 G/DL — SIGNIFICANT CHANGE UP (ref 6–8)
RBC # BLD: 3.92 M/UL — LOW (ref 4.7–6.1)
RBC # FLD: 11.4 % — LOW (ref 11.5–14.5)
SODIUM SERPL-SCNC: 133 MMOL/L — LOW (ref 135–146)
WBC # BLD: 8.86 K/UL — SIGNIFICANT CHANGE UP (ref 4.8–10.8)
WBC # FLD AUTO: 8.86 K/UL — SIGNIFICANT CHANGE UP (ref 4.8–10.8)

## 2022-12-09 PROCEDURE — 73610 X-RAY EXAM OF ANKLE: CPT | Mod: 26,RT

## 2022-12-09 PROCEDURE — 73590 X-RAY EXAM OF LOWER LEG: CPT | Mod: 26,RT

## 2022-12-09 PROCEDURE — 27818 TREATMENT OF ANKLE FRACTURE: CPT | Mod: 54

## 2022-12-09 PROCEDURE — 99284 EMERGENCY DEPT VISIT MOD MDM: CPT | Mod: 57

## 2022-12-09 PROCEDURE — 93010 ELECTROCARDIOGRAM REPORT: CPT

## 2022-12-09 RX ORDER — SODIUM CHLORIDE 9 MG/ML
1000 INJECTION INTRAMUSCULAR; INTRAVENOUS; SUBCUTANEOUS ONCE
Refills: 0 | Status: COMPLETED | OUTPATIENT
Start: 2022-12-09 | End: 2022-12-09

## 2022-12-09 RX ORDER — IBUPROFEN 200 MG
1 TABLET ORAL
Qty: 40 | Refills: 0
Start: 2022-12-09 | End: 2022-12-18

## 2022-12-09 NOTE — ED PROVIDER NOTE - NSFOLLOWUPCLINICS_GEN_ALL_ED_FT
[facility]  Orthpedic  [address], [place]   Phone: [phone]  Fax:   Follow Up Time: 4-6 Days

## 2022-12-09 NOTE — ED PROVIDER NOTE - NSFOLLOWUPINSTRUCTIONS_ED_ALL_ED_FT
Our Emergency Department Referral Coordinators will be reaching out ot you in the next 24-48 hours from 9:00am to 5:00pm (Monday to Friday) with a follow up appointment. Please expect a phone call from the hospital in that time frame. If you do not receive a call or if you have any questions or concerns, you can reach them at [phone] or [phone].    Ankle Fracture  The ankle joint is made up of the lower (distal) sections of your lower leg bones (tibia and fibula) along with a bone in your foot (talus). An ankle fracture is a break in one, two, or all three of these sections of bone. There are two general types of ankle fractures:  Stable fracture. This happens when one of your bones is broken, but the bones of your ankle joint stay in their normal positions.Unstable fracture. This type can include more than one broken bone. It can also happen if your outer bone is broken and the tough bands of tissue that connect bones (ligaments) are also injured at your inner ankle. This type of fracture allows the talus to move out of its normal position.What are the causes?  This condition may be caused by:  A hard, direct hit (blow) to the ankle.Quickly and severely twisting your ankle, often while your foot is planted and the rest of your body moving.Trauma, such as a car accident or falling from a height.What increases the risk?  This condition is more likely to occur in people who:  Smoke.Are overweight.Participate in sports that involve quick direction changes, as in soccer.Do high-impact sports like gymnastics or football.Are involved in a high-impact car accident.What are the signs or symptoms?  Symptoms of this condition include:  Tender and swollen ankle.Bruising around the injured ankle.Pain when moving or pressing on the ankle.Trouble walking or using the ankle to support your body weight (putting weight on the ankle).Pain that gets worse when moving or standing and gets better with rest.How is this diagnosed?  An ankle fracture is usually diagnosed with a physical exam and X-rays. A CT scan or MRI may also be done.  How is this treated?  Treatment for this condition depends on the type of ankle fracture you have. Stable fractures are treated with a cast, boot, or splint to hold the ankle still and crutches to avoid putting weight on the injured ankle until the fracture heals. Unstable fractures require surgery to ensure that the bones heal properly. After surgery, you will have a splint. After your incision is healed, your surgeon may give you a cast or a boot. You will not be able to put weight on your injured side for several weeks.  After your ankle has healed, you will do exercises to improve the strength and mobility of your ankle.  Follow these instructions at home:  If you have a splint:     Wear the splint as told by your health care provider. Remove it only as told by your health care provider.Loosen the splint if your toes tingle, become numb, or turn cold and blue.Keep the splint clean.If the splint is not waterproof:  Do not let it get wet.Cover it with a watertight covering when you take a bath or a shower.If you have a cast:     Do not stick anything inside the cast to scratch your skin. Doing that increases your risk of infection.Check the skin around the cast every day. Tell your health care provider about any concerns.You may put lotion on dry skin around the edges of the cast. Do not put lotion on the skin underneath the cast.Keep the cast clean.If the cast is not waterproof:  Do not let it get wet.Cover it with a watertight covering when you take a bath or a shower.Managing pain, stiffness, and swelling        If directed, put ice on the injured area:  If you have a removable splint, remove it as told by your health care provider.Put ice in a plastic bag.Place a towel between your skin and the bag or between your cast and the bag.Leave the ice on for 20 minutes, 2–3 times a day.Move your toes often to avoid stiffness and to lessen swelling.Raise (elevate) the injured area above the level of your heart while you are sitting or lying down.General instructions     Do not use the injured limb to support your body weight until your health care provider says that you can. Use crutches as told by your health care providerTake over-the-counter and prescription medicines only as told by your health care provider.Ask your health care provider when it is safe to drive if you have a cast or splint.Do exercises as told by your health care provider.Do not use any products that contain nicotine or tobacco, such as cigarettes and e-cigarettes. These can delay bone healing. If you need help quitting, ask your health care providerKeep all follow-up visits as told by your health care provider. This is important.Contact a health care provider if:  You have pain or swelling that gets worse or does not get better with rest or medicine.Get help right away if:  Your cast gets damaged.You have severe pain that lasts.You develop new pain or swelling.Your skin or toenails below the injury turn blue or gray, feel cold, become numb, or have a loss of sensitivity to touch.Summary  An ankle fracture can either be stable or unstable. This is determined after a physical exam and imaging studies like X-rays, a CT scan, or MRI.Stable fractures are treated with a cast, boot, or splint to hold the ankle still until the fracture heals. Unstable fractures require surgery to ensure that the bones heal properly.You will not be able to put weight on your injured side for several weeks.Pain medicines, icing, and raising (elevating) your injured ankle when sitting or lying down may help with pain relief. Follow instructions as told by your health care provider.This information is not intended to replace advice given to you by your health care provider. Make sure you discuss any questions you have with your health care provider.    Document Released: 12/15/2001 Document Revised: 02/27/2020 Document Reviewed: 01/19/2018  Elsevier Patient Education © 2020 [employer]

## 2022-12-09 NOTE — ED PROVIDER NOTE - ATTENDING CONTRIBUTION TO CARE
32-year-old female to male transgender patient with history of hysterectomy bilateral mastectomy 32-year-old female to male transgender patient with history of hysterectomy bilateral mastectomy, remote h/o drug abuse, comes in complaining of right ankle pain which started last night after he twisted it.  Unable to ambulate since that time.  Denies any other injuries.  On exam, pt in NAD, AAOx3 but extremely somnolent, head NC/AT, CN II-XII intact, pinpoint pupils, lungs CTA B/L, CV S1S2 regular, abdomen soft/NT/ND/(+)BS, ext (+) deformity and instability to right ankle, sensation to foot intact, pulses intact, (-) skin breaks. XR (+) tri-mal fracture. Fx reduced and splinted. Will repeat XR and reevaluate. Pt admits to taking heroin today.

## 2022-12-09 NOTE — ED PROVIDER NOTE - CARE PLAN
Principal Discharge DX:	Fx trimalleolar-closed, right, initial encounter  Secondary Diagnosis:	Opioid overdose   1

## 2022-12-09 NOTE — ED PROVIDER NOTE - NS ED ROS FT
Constitutional: No fever   Eyes:  No visual changes  Ears:  No hearing changes  Neck: No neck pain  Cardiac:  No chest pain  Respiratory:  No SOB   GI:  No abdominal pain, nausea, or vomiting  :  No dysuria  MS:  No back pain +ankle pain  Neuro:  No headache or weakness.  No LOC  Skin:  No skin rash

## 2022-12-09 NOTE — ED ADULT TRIAGE NOTE - ACCOMPANIED BY
wife Cimzia Pregnancy And Lactation Text: This medication crosses the placenta but can be considered safe in certain situations. Cimzia may be excreted in breast milk.

## 2022-12-09 NOTE — ED ADULT TRIAGE NOTE - CHIEF COMPLAINT QUOTE
pt here with wife that states he twisted his right ankle yesterday, on tirage patient was noted to be constantly falling asleep during interactions, slurring speech, pin point pupils, cyanotic finger tips and lips, responds to verbal stimulus, moved in front of nursing station. admits to snorting heroin after initially denying it

## 2022-12-09 NOTE — ED PROVIDER NOTE - PATIENT PORTAL LINK FT
You can access the FollowMyHealth Patient Portal offered by [facility] by registering at the following website: [email or web address]. By joining [facility]’s FollowMyHealth portal, you will also be able to view your health information using other applications (apps) compatible with our system.

## 2022-12-09 NOTE — ED PROVIDER NOTE - PHYSICAL EXAMINATION
SKIN: warm, dry  HEAD: Normocephalic atraumatic  EYES: no conjunctival erythema 2mm b/l peerla  ENT: no nasal discharge, airway clear  NECK: full ROM, non-tender  CARD: regular rate and rhythm  RESP: no wheezes, rales or rhonchi bradypneic  EXT: moving all extremities spontaneously severe ankle tenderness and swelling +2 dp   NEURO: drowsy but arouasable

## 2022-12-09 NOTE — ED PROVIDER NOTE - OBJECTIVE STATEMENT
32-year-old male present with right ankle pain.  It appears day he twisted his ankle while taking a step and felt instant pain.  He has been hopping around his house with pain.  Today he snorted a bag of heroin prior to coming in due to pain.  Denies numbness weakness.  No prior injuries in the location.

## 2022-12-09 NOTE — ED PROVIDER NOTE - CLINICAL SUMMARY MEDICAL DECISION MAKING FREE TEXT BOX
32-year-old female to male transgender patient with history of hysterectomy bilateral mastectomy, remote h/o drug abuse, comes in complaining of right ankle pain which started last night after he twisted it.  Unable to ambulate since that time.  Denies any other injuries.  On exam, pt in NAD, AAOx3 but extremely somnolent, head NC/AT, CN II-XII intact, pinpoint pupils, lungs CTA B/L, CV S1S2 regular, abdomen soft/NT/ND/(+)BS, ext (+) deformity and instability to right ankle, sensation to foot intact, pulses intact, (-) skin breaks. XR (+) tri-mal fracture. Fx reduced and splinted. Pt observed until fully awake and able to ambulate on crutches. Will d/c with ortho follow up.

## 2022-12-14 ENCOUNTER — EMERGENCY (EMERGENCY)
Facility: HOSPITAL | Age: 32
LOS: 0 days | Discharge: HOME | End: 2022-12-14
Attending: STUDENT IN AN ORGANIZED HEALTH CARE EDUCATION/TRAINING PROGRAM | Admitting: STUDENT IN AN ORGANIZED HEALTH CARE EDUCATION/TRAINING PROGRAM

## 2022-12-14 ENCOUNTER — APPOINTMENT (OUTPATIENT)
Age: 32
End: 2022-12-14

## 2022-12-14 ENCOUNTER — OUTPATIENT (OUTPATIENT)
Dept: OUTPATIENT SERVICES | Facility: HOSPITAL | Age: 32
LOS: 1 days | Discharge: HOME | End: 2022-12-14

## 2022-12-14 ENCOUNTER — NON-APPOINTMENT (OUTPATIENT)
Age: 32
End: 2022-12-14

## 2022-12-14 VITALS
DIASTOLIC BLOOD PRESSURE: 61 MMHG | HEART RATE: 90 BPM | OXYGEN SATURATION: 99 % | SYSTOLIC BLOOD PRESSURE: 129 MMHG | RESPIRATION RATE: 18 BRPM | TEMPERATURE: 98 F

## 2022-12-14 VITALS
HEIGHT: 67 IN | TEMPERATURE: 97 F | DIASTOLIC BLOOD PRESSURE: 63 MMHG | SYSTOLIC BLOOD PRESSURE: 133 MMHG | HEART RATE: 96 BPM | RESPIRATION RATE: 18 BRPM | OXYGEN SATURATION: 99 % | WEIGHT: 139.99 LBS

## 2022-12-14 DIAGNOSIS — S82.841A DISPLACED BIMALLEOLAR FRACTURE OF RIGHT LOWER LEG, INITIAL ENCOUNTER FOR CLOSED FRACTURE: ICD-10-CM

## 2022-12-14 DIAGNOSIS — S90.01XA CONTUSION OF RIGHT ANKLE, INITIAL ENCOUNTER: ICD-10-CM

## 2022-12-14 DIAGNOSIS — S82.843A DISPLACED BIMALLEOLAR FRACTURE OF UNSPECIFIED LOWER LEG, INITIAL ENCOUNTER FOR CLOSED FRACTURE: ICD-10-CM
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
## 2022-12-14 DIAGNOSIS — Z91.041 RADIOGRAPHIC DYE ALLERGY STATUS: ICD-10-CM

## 2022-12-14 DIAGNOSIS — Z91.013 ALLERGY TO SEAFOOD: ICD-10-CM

## 2022-12-14 PROCEDURE — 99284 EMERGENCY DEPT VISIT MOD MDM: CPT

## 2022-12-14 PROCEDURE — 73610 X-RAY EXAM OF ANKLE: CPT | Mod: 26,RT

## 2022-12-14 PROCEDURE — 73610 X-RAY EXAM OF ANKLE: CPT | Mod: 26,RT,77

## 2022-12-14 NOTE — CONSULT NOTE ADULT - SUBJECTIVE AND OBJECTIVE BOX
Orthopaedics Consult Note    RADHA NGUYEN  [id number]    Patient is a 32y year old Male with right bimalleolar 5 days ago, closed reduced by ED. Seen in clinic today and XRs demonstrated displacement. Patient was sent to ED for re-reduction. Denies numbness/tingling    PMH/PSH  ^ANKLE NEEDS TO BE RESET    MEWS Score    Bimalleolar fracture of right ankle    ANKLE NEEDS TO BE RESET    4    [email or web address]        Medications      Allergies  fish (Other)  iodine (Other)        T(C): 36.7 (12-14-22 @ 18:13), Max: 36.7 (12-14-22 @ 18:13)  HR: 90 (12-14-22 @ 18:13) (90 - 96)  BP: 129/61 (12-14-22 @ 18:13) (129/61 - 133/63)  RR: 18 (12-14-22 @ 18:13) (18 - 18)  SpO2: 99% (12-14-22 @ 18:13) (99% - 99%)    Physical Exam  NAD  Breathing comfortably on RA  Resting comfortably    RLE  skin intact  +ecchymoses  +swelling  +ttp  No gross deformity  Motor: TA/EHL/FHL/Gastroc intact  Sensory: SP/DP/Costa/Sa intact  Vasc: foot WWP    Labs                Img  XR right ankle: bimalleolar ankle fracture    Procedure  20 cc 1% lidocaine used for intra-articular block. Closed reduced and placed in short leg splint    A/P: Patient is a 32y year old Male with closed right bimalleolar ankle fracture s/p closed reduction    NWB RLE  splint care instructions provided  Elevate extremity  Patient will be scheduled for surgery next Thursday  Return to clinic: Orthopaedic office hour AFTER SURGERY please call [phone] to schedule an appointment   Return to ED with uncontrolled pain/bleeding/fever/chills/numbness/tingling/cool extremity/inability to move extremity

## 2022-12-14 NOTE — ED PROVIDER NOTE - CARE PROVIDER_API CALL
Geremias Nava)  Jim [facility]  [address] [place] [address]  Phone: [phone]  Fax: [phone]  Follow Up Time: 1-3 Days

## 2022-12-14 NOTE — ED PROVIDER NOTE - PROGRESS NOTE DETAILS
SR: spoke with ortho aware of consult SR: ortho bedside. SR: spoke with ortho who completed reduction, plan for patient to follow up with ortho friday/monday and surgery scheduled for thursday with Dr. Nava

## 2022-12-14 NOTE — ED PROVIDER NOTE - CLINICAL SUMMARY MEDICAL DECISION MAKING FREE TEXT BOX
32-year-old male was seen in the ED December 9 after he presented with right ankle pain after twisting it which time he had a bimalleolar fracture that was successfully reduced in the ED followed up with Ortho today where he had an x-ray performed that demonstrated patient status post by mall fracture with ankle mortise disruption 5 days ago the extremity was in a splint in appropriate anatomic alignment at this time alignment has been disrupted Ortho recommended patient come to the ED to be evaluated by Ortho team for further reduction.  Patient states has been walking with crutches has been taking his dad's Vicodin for pain no new injuries vs reviewed, ortho consulted, reduction completed by ortho post reduction imaging performed plan for patient to follow up friday/monday and surgery scheduled for thursday with Dr. Nava. Patient a spoken to in detail about results  All questions addressed.  Results of ED work up discussed Patient has proper follow up. Return precautions given.

## 2022-12-14 NOTE — ED PROVIDER NOTE - PATIENT PORTAL LINK FT
You can access the FollowMyHealth Patient Portal offered by [facility] by registering at the following website: [email or web address]. By joining [facility] [employer]’s FollowMyHealth portal, you will also be able to view your health information using other applications (apps) compatible with our system.

## 2022-12-14 NOTE — ED PROVIDER NOTE - NSFOLLOWUPINSTRUCTIONS_ED_ALL_ED_FT
Please follow up with Dr. Nava 1-3 days prior to surgery as surgery is scheduled for thursday.  Fracture    A fracture is a break in one of your bones. This can occur from a variety of injuries, especially traumatic ones. Symptoms include pain, bruising, or swelling. Do not use the injured limb. If a fracture is in one of the bones below your waist, do not put weight on that limb unless instructed to do so by your healthcare provider. Crutches or a cane may have been provided. A splint or cast may have been applied by your health care provider. Make sure to keep it dry and follow up with an orthopedist as instructed.    SEEK IMMEDIATE MEDICAL CARE IF YOU HAVE ANY OF THE FOLLOWING SYMPTOMS: numbness, tingling, increasing pain, or weakness in any part of the injured limb.

## 2022-12-14 NOTE — ED PROVIDER NOTE - PHYSICAL EXAMINATION
CONSTITUTIONAL: WA / WN / NAD  HEAD: NCAT  EYES: PERRL; EOMI;   ENT: Normal pharynx; mucous membranes pink/moist, no erythema.  NECK: Supple; no meningeal signs  MSK/EXT: + ecchymosis to right ankle & ttp   SKIN: Warm and dry;   NEURO: AAOx3  PSYCH: Memory Intact, Normal Affect

## 2022-12-14 NOTE — ED PROVIDER NOTE - OBJECTIVE STATEMENT
32-year-old male was seen in the ED December 9 after he presented with right ankle pain after twisting it which time he had a bimalleolar fracture that was successfully reduced in the ED followed up with Ortho today where he had an x-ray performed that demonstrated patient status post by mall fracture with ankle mortise disruption 5 days ago the extremity was in a splint in appropriate anatomic alignment at this time alignment has been disrupted Ortho recommended patient come to the ED to be evaluated by Ortho team for further reduction.  Patient states has been walking with crutches has been taking his dad's Vicodin for pain no new injuries

## 2022-12-15 DIAGNOSIS — S82.899A OTHER FRACTURE OF UNSPECIFIED LOWER LEG, INITIAL ENCOUNTER FOR CLOSED FRACTURE: ICD-10-CM

## 2022-12-15 DIAGNOSIS — S82.53XA DISPLACED FRACTURE OF MEDIAL MALLEOLUS OF UNSPECIFIED TIBIA, INITIAL ENCOUNTER FOR CLOSED FRACTURE: ICD-10-CM

## 2022-12-15 DIAGNOSIS — S82.843A DISPLACED BIMALLEOLAR FRACTURE OF UNSPECIFIED LOWER LEG, INITIAL ENCOUNTER FOR CLOSED FRACTURE: ICD-10-CM

## 2022-12-15 DIAGNOSIS — S82.63XA DISPLACED FRACTURE OF LATERAL MALLEOLUS OF UNSPECIFIED FIBULA, INITIAL ENCOUNTER FOR CLOSED FRACTURE: ICD-10-CM

## 2022-12-18 ENCOUNTER — EMERGENCY (EMERGENCY)
Facility: HOSPITAL | Age: 32
LOS: 0 days | Discharge: HOME | End: 2022-12-18
Attending: EMERGENCY MEDICINE | Admitting: EMERGENCY MEDICINE
Payer: MEDICAID

## 2022-12-18 ENCOUNTER — OUTPATIENT (OUTPATIENT)
Dept: OUTPATIENT SERVICES | Facility: HOSPITAL | Age: 32
LOS: 1 days | Discharge: HOME | End: 2022-12-18

## 2022-12-18 VITALS
OXYGEN SATURATION: 99 % | WEIGHT: 145.06 LBS | SYSTOLIC BLOOD PRESSURE: 146 MMHG | HEIGHT: 67 IN | HEART RATE: 96 BPM | DIASTOLIC BLOOD PRESSURE: 82 MMHG | RESPIRATION RATE: 18 BRPM

## 2022-12-18 VITALS
HEART RATE: 89 BPM | DIASTOLIC BLOOD PRESSURE: 72 MMHG | TEMPERATURE: 97 F | RESPIRATION RATE: 16 BRPM | SYSTOLIC BLOOD PRESSURE: 135 MMHG | OXYGEN SATURATION: 97 %

## 2022-12-18 DIAGNOSIS — Z88.8 ALLERGY STATUS TO OTHER DRUGS, MEDICAMENTS AND BIOLOGICAL SUBSTANCES STATUS: ICD-10-CM

## 2022-12-18 DIAGNOSIS — M79.89 OTHER SPECIFIED SOFT TISSUE DISORDERS: ICD-10-CM

## 2022-12-18 DIAGNOSIS — Z91.018 ALLERGY TO OTHER FOODS: ICD-10-CM

## 2022-12-18 DIAGNOSIS — M25.571 PAIN IN RIGHT ANKLE AND JOINTS OF RIGHT FOOT: ICD-10-CM

## 2022-12-18 DIAGNOSIS — R42 DIZZINESS AND GIDDINESS: ICD-10-CM

## 2022-12-18 LAB
ALBUMIN SERPL ELPH-MCNC: 4.3 G/DL — SIGNIFICANT CHANGE UP (ref 3.5–5.2)
ALP SERPL-CCNC: 76 U/L — SIGNIFICANT CHANGE UP (ref 30–115)
ALT FLD-CCNC: 11 U/L — SIGNIFICANT CHANGE UP (ref 0–41)
ANION GAP SERPL CALC-SCNC: 12 MMOL/L — SIGNIFICANT CHANGE UP (ref 7–14)
APAP SERPL-MCNC: <5 UG/ML — LOW (ref 10–30)
AST SERPL-CCNC: 17 U/L — SIGNIFICANT CHANGE UP (ref 0–41)
BASOPHILS # BLD AUTO: 0.04 K/UL — SIGNIFICANT CHANGE UP (ref 0–0.2)
BASOPHILS NFR BLD AUTO: 0.6 % — SIGNIFICANT CHANGE UP (ref 0–1)
BILIRUB SERPL-MCNC: 0.4 MG/DL — SIGNIFICANT CHANGE UP (ref 0.2–1.2)
BUN SERPL-MCNC: 7 MG/DL — LOW (ref 10–20)
CALCIUM SERPL-MCNC: 9.5 MG/DL — SIGNIFICANT CHANGE UP (ref 8.4–10.5)
CHLORIDE SERPL-SCNC: 95 MMOL/L — LOW (ref 98–110)
CO2 SERPL-SCNC: 31 MMOL/L — SIGNIFICANT CHANGE UP (ref 17–32)
CREAT SERPL-MCNC: 0.7 MG/DL — SIGNIFICANT CHANGE UP (ref 0.7–1.5)
EGFR: 125 ML/MIN/1.73M2 — SIGNIFICANT CHANGE UP
EOSINOPHIL # BLD AUTO: 0.08 K/UL — SIGNIFICANT CHANGE UP (ref 0–0.7)
EOSINOPHIL NFR BLD AUTO: 1.2 % — SIGNIFICANT CHANGE UP (ref 0–8)
GLUCOSE SERPL-MCNC: 124 MG/DL — HIGH (ref 70–99)
HCT VFR BLD CALC: 35.3 % — LOW (ref 42–52)
HGB BLD-MCNC: 11.9 G/DL — LOW (ref 14–18)
IMM GRANULOCYTES NFR BLD AUTO: 0.1 % — SIGNIFICANT CHANGE UP (ref 0.1–0.3)
LYMPHOCYTES # BLD AUTO: 0.8 K/UL — LOW (ref 1.2–3.4)
LYMPHOCYTES # BLD AUTO: 11.7 % — LOW (ref 20.5–51.1)
MCHC RBC-ENTMCNC: 31.2 PG — HIGH (ref 27–31)
MCHC RBC-ENTMCNC: 33.7 G/DL — SIGNIFICANT CHANGE UP (ref 32–37)
MCV RBC AUTO: 92.4 FL — SIGNIFICANT CHANGE UP (ref 80–94)
MONOCYTES # BLD AUTO: 0.58 K/UL — SIGNIFICANT CHANGE UP (ref 0.1–0.6)
MONOCYTES NFR BLD AUTO: 8.5 % — SIGNIFICANT CHANGE UP (ref 1.7–9.3)
NEUTROPHILS # BLD AUTO: 5.34 K/UL — SIGNIFICANT CHANGE UP (ref 1.4–6.5)
NEUTROPHILS NFR BLD AUTO: 77.9 % — HIGH (ref 42.2–75.2)
NRBC # BLD: 0 /100 WBCS — SIGNIFICANT CHANGE UP (ref 0–0)
NT-PROBNP SERPL-SCNC: 26 PG/ML — SIGNIFICANT CHANGE UP (ref 0–300)
PLATELET # BLD AUTO: 286 K/UL — SIGNIFICANT CHANGE UP (ref 130–400)
POTASSIUM SERPL-MCNC: 3.8 MMOL/L — SIGNIFICANT CHANGE UP (ref 3.5–5)
POTASSIUM SERPL-SCNC: 3.8 MMOL/L — SIGNIFICANT CHANGE UP (ref 3.5–5)
PROT SERPL-MCNC: 6.9 G/DL — SIGNIFICANT CHANGE UP (ref 6–8)
RBC # BLD: 3.82 M/UL — LOW (ref 4.7–6.1)
RBC # FLD: 11.1 % — LOW (ref 11.5–14.5)
SALICYLATES SERPL-MCNC: <0.3 MG/DL — LOW (ref 4–30)
SODIUM SERPL-SCNC: 138 MMOL/L — SIGNIFICANT CHANGE UP (ref 135–146)
TROPONIN T SERPL-MCNC: <0.01 NG/ML — SIGNIFICANT CHANGE UP
WBC # BLD: 6.85 K/UL — SIGNIFICANT CHANGE UP (ref 4.8–10.8)
WBC # FLD AUTO: 6.85 K/UL — SIGNIFICANT CHANGE UP (ref 4.8–10.8)

## 2022-12-18 PROCEDURE — 99285 EMERGENCY DEPT VISIT HI MDM: CPT

## 2022-12-18 PROCEDURE — 93010 ELECTROCARDIOGRAM REPORT: CPT

## 2022-12-18 PROCEDURE — 73700 CT LOWER EXTREMITY W/O DYE: CPT | Mod: 26,RT

## 2022-12-18 PROCEDURE — 93970 EXTREMITY STUDY: CPT | Mod: 26

## 2022-12-18 RX ORDER — SODIUM CHLORIDE 9 MG/ML
1000 INJECTION, SOLUTION INTRAVENOUS ONCE
Refills: 0 | Status: COMPLETED | OUTPATIENT
Start: 2022-12-18 | End: 2022-12-18

## 2022-12-18 RX ORDER — ONDANSETRON 8 MG/1
4 TABLET, FILM COATED ORAL ONCE
Refills: 0 | Status: COMPLETED | OUTPATIENT
Start: 2022-12-18 | End: 2022-12-18

## 2022-12-18 RX ORDER — KETOROLAC TROMETHAMINE 30 MG/ML
1 SYRINGE (ML) INJECTION
Qty: 12 | Refills: 0
Start: 2022-12-18 | End: 2022-12-21

## 2022-12-18 RX ADMIN — ONDANSETRON 4 MILLIGRAM(S): 8 TABLET, FILM COATED ORAL at 12:29

## 2022-12-18 RX ADMIN — SODIUM CHLORIDE 1000 MILLILITER(S): 9 INJECTION, SOLUTION INTRAVENOUS at 12:28

## 2022-12-18 NOTE — ED PROVIDER NOTE - PATIENT PORTAL LINK FT
You can access the FollowMyHealth Patient Portal offered by [facility] by registering at the following website: [email or web address]. By joining [employer]’s FollowMyHealth portal, you will also be able to view your health information using other applications (apps) compatible with our system.

## 2022-12-18 NOTE — ED PROVIDER NOTE - OBJECTIVE STATEMENT
32 year old male, no pmhx presenting s/p taking a pill from a friend (unsure what pill it was but states he thinks it was percocet). States since then he has felt lightheaded. Otherwise denies pain and denies fevers, chest pain, dyspnea, N/V/D, blood in stool, urinary symptoms or any other complaints. Also complaining of unilateral leg swelling/pain since yesterday.

## 2022-12-18 NOTE — ED ADULT NURSE NOTE - OBJECTIVE STATEMENT
31 y/o male alert oriented x 3 presented to ed for dizziness s/p sniffing unknown substance from friend to help with pain. Pt stated he suffers from anxiety and started feeling dizzy after sniffing unknown substance. Denies any blurry vision

## 2022-12-18 NOTE — ED ADULT NURSE NOTE - TEMPLATE LIST FOR HEAD TO TOE ASSESSMENT
History  Chief Complaint   Patient presents with    Hyperglycemia - Symptomatic     patient states that her blood sugar was 331 this an, c/o weakness, urinary frequency and dizziness     39 y/o female presents to ED with multiple complaints  States last week she started with cough and cold symptoms  States her family had similar symptoms  Over the past 3-4 days she has episodic chest tightness in the center of her chest  Comes and goes  Associated with mild shortness of breath  No fevers but has had chills  Denies nausea or vomiting  Continues to have dry cough  No hemoptysis  No h/o PE or DVT  No recent travels, traumas, surgeries  No leg pain or swelling  She also notes that she is a diabetic but has not been taking her insulin and metformin for the past 2 years as she was trying to control her blood glucose on her own  She notes her glucose has not been controlled and is always elevated  She notes chronic increased urinary frequency  She has epigastric pain intermittently over the past 1 week that began with her cold and cough  No vomiting  Normal bowel movements  No prior cardiac history  Does have PMHx of asthma           History provided by:  Patient   used: No    Chest Pain   Pain location:  Substernal area  Pain quality: tightness    Pain radiates to:  Does not radiate  Pain radiates to the back: no    Pain severity:  Mild  Onset quality:  Gradual  Duration:  4 days  Timing:  Intermittent  Progression:  Waxing and waning  Chronicity:  New  Context: at rest    Context: not breathing, no drug use, not eating, no intercourse, not lifting, no movement, not raising an arm, no stress and no trauma    Context comment:  Concurrently with URI symptoms  Relieved by:  Nothing  Worsened by:  Coughing (coughing)  Ineffective treatments:  None tried  Associated symptoms: abdominal pain, cough and shortness of breath    Associated symptoms: no AICD problem, no altered mental status, no anorexia, no anxiety, no back pain, no claudication, no diaphoresis, no dizziness, no dysphagia, no fatigue, no fever, no headache, no heartburn, no lower extremity edema, no nausea, no near-syncope, no numbness, no orthopnea, no palpitations, no PND, no syncope, not vomiting and no weakness    Abdominal pain:     Location:  Epigastric    Quality:  Aching    Severity:  Mild    Onset quality:  Gradual    Duration:  1 week    Timing:  Sporadic    Progression:  Waxing and waning    Chronicity:  New  Cough:     Cough characteristics:  Non-productive    Sputum characteristics:  Nondescript    Severity:  Mild    Onset quality:  Gradual    Duration:  1 week    Timing:  Sporadic    Progression:  Unchanged    Chronicity:  New  Shortness of breath:     Severity:  Mild    Onset quality:  Gradual    Duration:  1 week    Timing:  Constant    Progression:  Waxing and waning  Risk factors: diabetes mellitus    Risk factors: no aortic disease, no birth control, no coronary artery disease, no Carmen-Danlos syndrome, no high cholesterol, no hypertension, no immobilization, not male, no Marfan's syndrome, not obese, not pregnant, no prior DVT/PE, no smoking and no surgery        None       Past Medical History:   Diagnosis Date    Asthma     Diabetes mellitus ([facility] [address] )     Fibromyalgia        Past Surgical History:   Procedure Laterality Date     SECTION      CHOLECYSTECTOMY      TUBAL LIGATION         History reviewed  No pertinent family history  I have reviewed and agree with the history as documented  Social History   Substance Use Topics    Smoking status: Never Smoker    Smokeless tobacco: Never Used    Alcohol use No        Review of Systems   Constitutional: Negative for activity change, appetite change, chills, diaphoresis, fatigue, fever and unexpected weight change  HENT: Negative for congestion, rhinorrhea, sinus pressure, sore throat and trouble swallowing  Eyes: Negative for photophobia and visual disturbance  Respiratory: Positive for cough and shortness of breath  Negative for apnea, choking, chest tightness, wheezing and stridor  Cardiovascular: Positive for chest pain  Negative for palpitations, orthopnea, claudication, leg swelling, syncope, PND and near-syncope  Gastrointestinal: Positive for abdominal pain  Negative for abdominal distention, anorexia, blood in stool, constipation, diarrhea, heartburn, nausea and vomiting  Genitourinary: Negative for decreased urine volume, difficulty urinating, dysuria, enuresis, flank pain, frequency, hematuria and urgency  Musculoskeletal: Negative for arthralgias, back pain, myalgias, neck pain and neck stiffness  Skin: Negative for color change, pallor, rash and wound  Allergic/Immunologic: Negative  Neurological: Negative for dizziness, tremors, syncope, weakness, light-headedness, numbness and headaches  Hematological: Negative  Psychiatric/Behavioral: Negative  All other systems reviewed and are negative  Physical Exam  ED Triage Vitals [03/04/18 1219]   Temperature Pulse Respirations Blood Pressure SpO2   98 2 °F (36 8 °C) 96 18 135/70 100 %      Temp Source Heart Rate Source Patient Position - Orthostatic VS BP Location FiO2 (%)   Oral Monitor Sitting Right arm --      Pain Score       No Pain           Orthostatic Vital Signs  Vitals:    03/04/18 1219 03/04/18 1429 03/04/18 1654   BP: 135/70 130/82    Pulse: 96 84 95   Patient Position - Orthostatic VS: Sitting Sitting Lying       Physical Exam   Constitutional: She is oriented to person, place, and time  She appears well-developed and well-nourished  Non-toxic appearance  She does not have a sickly appearance  She does not appear ill  No distress  HENT:   Head: Normocephalic and atraumatic  Eyes: EOM and lids are normal  Pupils are equal, round, and reactive to light  Neck: Normal range of motion  Neck supple     Cardiovascular: Normal rate, regular rhythm, S1 normal, S2 normal, normal heart sounds, intact distal pulses and normal pulses  Exam reveals no gallop, no distant heart sounds, no friction rub and no decreased pulses  No murmur heard  Pulses:       Radial pulses are 2+ on the right side, and 2+ on the left side  Pulmonary/Chest: Effort normal  No accessory muscle usage  No apnea, no tachypnea and no bradypnea  No respiratory distress  She has no decreased breath sounds  She has wheezes (bilateral)  She has no rhonchi  She has no rales  No chest wall tenderness   Abdominal: Soft  Normal appearance and bowel sounds are normal  She exhibits no distension and no mass  There is no tenderness  There is no rigidity, no rebound and no guarding  No hernia  Musculoskeletal: Normal range of motion  She exhibits no edema, tenderness or deformity  Neurological: She is alert and oriented to person, place, and time  No cranial nerve deficit  GCS eye subscore is 4  GCS verbal subscore is 5  GCS motor subscore is 6  GCS 15  AAOx3  Ambulating in department without difficulty  CN II-XII grossly intact  No focal neuro deficits  Skin: Skin is warm, dry and intact  No rash noted  She is not diaphoretic  No erythema  No pallor  Psychiatric: Her speech is normal    Nursing note and vitals reviewed        ED Medications  Medications   metFORMIN (GLUCOPHAGE) tablet 1,000 mg (not administered)   sodium chloride 0 9 % bolus 1,000 mL (1,000 mL Intravenous New Bag 3/4/18 1651)   albuterol inhalation solution 5 mg (5 mg Nebulization Given 3/4/18 1520)   ipratropium (ATROVENT) 0 02 % inhalation solution 0 5 mg (0 5 mg Nebulization Given 3/4/18 1520)   ketorolac (TORADOL) injection 15 mg (15 mg Intravenous Given 3/4/18 1654)       Diagnostic Studies  Results Reviewed     Procedure Component Value Units Date/Time    Troponin I [[id number]]  (Normal) Collected:  03/04/18 1614    Lab Status:  Final result Specimen:  Blood from Arm, Left Updated:  03/04/18 1643     Troponin I <0 02 ng/mL Narrative:         [employer] Chemistry analyzer 99% cutoff is > 0 04 ng/mL in network labs    o cTnI 99% cutoff is useful only when applied to patients in the clinical setting of myocardial ischemia  o cTnI 99% cutoff should be interpreted in the context of clinical history, ECG findings and possibly cardiac imaging to establish correct diagnosis  o cTnI 99% cutoff may be suggestive but clearly not indicative of a coronary event without the clinical setting of myocardial ischemia  Basic metabolic panel [[id number]]  (Abnormal) Collected:  03/04/18 1614    Lab Status:  Final result Specimen:  Blood from Arm, Left Updated:  03/04/18 1640     Sodium 136 mmol/L      Potassium 3 7 mmol/L      Chloride 101 mmol/L      CO2 24 mmol/L      Anion Gap 11 mmol/L      BUN 9 mg/dL      Creatinine 0 92 mg/dL      Glucose 336 (H) mg/dL      Calcium 9 1 mg/dL      eGFR 78 ml/min/1 73sq m     Narrative:         National Kidney Disease Education Program recommendations are as follows:  GFR calculation is accurate only with a steady state creatinine  Chronic Kidney disease less than 60 ml/min/1 73 sq  meters  Kidney failure less than 15 ml/min/1 73 sq  meters      Hepatic function panel [[id number]]  (Abnormal) Collected:  03/04/18 1614    Lab Status:  Final result Specimen:  Blood from Arm, Left Updated:  03/04/18 1640     Total Bilirubin 1 10 (H) mg/dL      Bilirubin, Direct 0 18 mg/dL      Alkaline Phosphatase 76 U/L      AST 19 U/L      ALT 31 U/L      Total Protein 7 6 g/dL      Albumin 3 0 (L) g/dL     CBC and differential [[id number]]  (Abnormal) Collected:  03/04/18 1614    Lab Status:  Final result Specimen:  Blood from Arm, Left Updated:  03/04/18 1619     WBC 6 02 Thousand/uL      RBC 4 64 Million/uL      Hemoglobin 15 2 g/dL      Hematocrit 41 3 %      MCV 89 fL      MCH 32 8 pg      MCHC 36 8 g/dL      RDW 11 5 (L) %      MPV 10 1 fL      Platelets 946 Thousands/uL      nRBC 0 /100 WBCs      Neutrophils Relative 45 % Lymphocytes Relative 46 (H) %      Monocytes Relative 7 %      Eosinophils Relative 2 %      Basophils Relative 1 %      Neutrophils Absolute 2 68 Thousands/µL      Lymphocytes Absolute 2 77 Thousands/µL      Monocytes Absolute 0 40 Thousand/µL      Eosinophils Absolute 0 11 Thousand/µL      Basophils Absolute 0 03 Thousands/µL     POCT pregnancy, urine [[id number]]  (Normal) Resulted:  03/04/18 1517    Lab Status:  Final result Updated:  03/04/18 1517     EXT PREG TEST UR (Ref: Negative) negative    POCT urinalysis dipstick [[id number]]  (Normal) Resulted:  03/04/18 1514    Lab Status:  Final result Specimen:  Urine Updated:  03/04/18 1517     Color, UA yellow     Clarity, UA clear     EXT Glucose, UA 2 or more     EXT Bilirubin, UA (Ref: Negative) negative     EXT Ketones, UA (Ref: Negative) negative     EXT Spec Grav, UA 1 010     EXT Blood, UA (Ref: Negative) negative     EXT pH, UA 6 5     EXT Protein, UA (Ref: Negative) negative     EXT Urobilinogen, UA (Ref: 0 2- 1 0) 0 2     EXT Leukocytes, UA (Ref: Negative) negative     EXT Nitrite, UA (Ref: Negative) negative    Fingerstick Glucose (POCT) [[id number]]  (Abnormal) Collected:  03/04/18 1224    Lab Status:  Final result Updated:  03/04/18 1227     POC Glucose 317 (H) mg/dl                  XR chest 2 views   Final Result by Claritza Rush DO (03/04 1604)      No acute cardiopulmonary disease              Workstation performed: [id number]                    Procedures  ECG 12 Lead Documentation  Date/Time: 3/4/2018 2:58 PM  Performed by: Oseas Cui  Authorized by: Yuliana Haynes     Indications / Diagnosis:  Chest tightness  ECG reviewed by me, the ED Provider: yes    Patient location:  ED  Previous ECG:     Previous ECG:  Unavailable    Comparison to cardiac monitor: Yes    Interpretation:     Interpretation: normal    Quality:     Tracing quality:  Limited by artifact  Rate:     ECG rate:  80    ECG rate assessment: normal    Rhythm:     Rhythm: sinus rhythm    Ectopy:     Ectopy: none    QRS:     QRS axis:  Normal    QRS intervals:  Normal  Conduction:     Conduction: normal    ST segments:     ST segments:  Normal  T waves:     T waves: normal             Phone Contacts  ED Phone Contact    ED Course  ED Course as of Mar 04 1820   Carol Page Mar 04, 2018   1658 Anion Gap: 11         HEART Risk Score    Flowsheet Row Most Recent Value   History  0 Filed at: 03/04/2018 1820   ECG  0 Filed at: 03/04/2018 1820   Age  0 Filed at: 03/04/2018 1820   Risk Factors  1 Filed at: 03/04/2018 1820   Troponin  0 Filed at: 03/04/2018 1820   Heart Score Risk Calculator   History  0 Filed at: 03/04/2018 1820   ECG  0 Filed at: 03/04/2018 1820   Age  0 Filed at: 03/04/2018 1820   Risk Factors  1 Filed at: 03/04/2018 1820   Troponin  0 Filed at: 03/04/2018 1820   HEART Score  1 Filed at: 03/04/2018 1820   HEART Score  1 Filed at: 03/04/2018 1820                            MDM  Number of Diagnoses or Management Options  Acute bronchitis: new and requires workup  Chest tightness: new and requires workup  Hyperglycemia: new and requires workup  Diagnosis management comments: DDX including but not limited to: ACS, MI, PE, PTX, pneumonia, bronchitis dissection, pleurisy, pericarditis, myocarditis, rhabdomyolysis, GI etiology, gastritis, DKA  Plan: cardiac workup  Doubt PE  Can r/o by [facility]  Breathing tx  Analgesia  Labs  dispo pending  Amount and/or Complexity of Data Reviewed  Clinical lab tests: ordered and reviewed  Tests in the radiology section of CPT®: ordered and reviewed  Independent visualization of images, tracings, or specimens: yes    Risk of Complications, Morbidity, and/or Mortality  Presenting problems: moderate  Management options: low  General comments: 39 y/o female with chest tightness, cough, sob  All symptoms resolved after breathing tx and toradol  Her daughter recently had similar symptoms (bronchitis)  She has no prior cardiac history   She has hyperglycemia but has not been taking her medications and is asymptomatic at this time  We will restart her metformin  She is not in DKA at this time  She has no ketones in her urine  I suspect bronchitis as etiology of her symptoms, particularly as patient had wheezing and tightness that completely resolved after breathing tx  Her labs are normal  Troponin is normal despite 2-3 days of chest tightness  Normal EKG  Has close follow up with a local family doctor  Lives locally and can easily return  We discussed return parameters  We discussed the importance being compliant with her diabetic regimen  She states she will contact her endocrinologist for a follow up appointment  Return parameters provided  Pt understands and agrees with plan  Patient Progress  Patient progress: stable    CritCare Time    Disposition  Final diagnoses:   Acute bronchitis   Hyperglycemia   Chest tightness     Time reflects when diagnosis was documented in both MDM as applicable and the Disposition within this note     Time User Action Codes Description Comment    3/4/2018  5:58 PM Lezlie Baumgarten L Add [J20 9] Acute bronchitis     3/4/2018  5:58 PM Lezlie Baumgarten L Add [R73 9] Hyperglycemia     3/4/2018  5:58 PM Ayaka Louis Add [R07 89] Chest tightness       ED Disposition     ED Disposition Condition Comment    Discharge  Braeden Loera discharge to home/self care  Condition at discharge: Good        Follow-up Information     Follow up With Specialties Details Why [address] MD Family Medicine Call if unable to follow up with your family doctor/if you need a new family doctor; for hyperglycemia and chest tightness   [address]          Patient's Medications   Discharge Prescriptions    METFORMIN (GLUCOPHAGE) 1000 MG TABLET    Take 1 tablet (1,000 mg total) by mouth 2 (two) times a day with meals       Start Date: 3/4/2018  End Date: --       Order Dose: 1,000 mg Quantity: 40 tablet    Refills: 0       Outpatient Discharge Orders  Stress test only, exercise   Standing Status: Future  Standing Exp   Date: 03/04/22         ED Provider  Electronically Signed by           Kris Lagnuas PA-C  03/04/18 2472 Neuro

## 2022-12-18 NOTE — ED PROVIDER NOTE - CLINICAL SUMMARY MEDICAL DECISION MAKING FREE TEXT BOX
Patient presented s/p feeling lightheaded after taking pill from friend. Otherwise afebrile, HD stable, well appearing. Obtained labs which were grossly unremarkable including no significant leukocytosis, anemia, signs of dehydration/KOLE, transaminitis or significant electrolyte abnormalities. Complaining of unilateral leg swelling x 1 day as well but DVT study negative and no signs of infection/significant swelling on exam. Patient felt better after work up, able to ambulate, tolerates PO. Given the above, will discharge home with outpatient follow up. Patient agreeable with plan. Agrees to return to ED for any new or worsening symptoms.

## 2022-12-18 NOTE — ED PROVIDER NOTE - NSFOLLOWUPINSTRUCTIONS_ED_ALL_ED_FT
Follow up with your primary care doctor.    Dizziness    Dizziness can manifest as a feeling of unsteadiness or light-headedness. You may feel like you are about to faint. This condition can be caused by a number of things, including medicines, dehydration, or illness. Drink enough fluid to keep your urine clear or pale yellow. Do not drink alcohol and limit your caffeine intake. Avoid quick or sudden movements.  Rise slowly from chairs and steady yourself until you feel okay. In the morning, first sit up on the side of the bed.    SEEK IMMEDIATE MEDICAL CARE IF YOU HAVE ANY OF THE FOLLOWING SYMPTOMS: vomiting, changes in your vision or speech, weakness in your arms or legs, trouble speaking or swallowing, chest pain, abdominal pain, shortness of breath, sweating, bleeding, headache, neck pain, or fever.

## 2022-12-18 NOTE — ED ADULT NURSE NOTE - NSHOSCREENINGQ1_ED_ALL_ED
Patient wants to sit in a wheelchair because she states that lying in the bed makes her head hurt worse. Head of bed elevated to 90 degrees for patient and patient refuses to lie in the bed with head elevated at 90 degrees. Patient was sitting on the edge of the bed and then moved to a chair in the room. Patient a son keeps requesting a wheelchair so she can sit in. This nurse explained to patient that the ER physician still needs to examine her and that she could possibly fall out of the wheelchair if she becomes lightheaded or dizzy. Patient voices understanding and still request to sit in a wheelchair. Wheelchair taken into room and patient sitting in wheelchair at this time.      Wendy Hewitt RN  10/23/18 1463 No

## 2022-12-19 PROBLEM — Z78.9 OTHER SPECIFIED HEALTH STATUS: Chronic | Status: ACTIVE | Noted: 2022-12-18

## 2022-12-20 ENCOUNTER — TRANSCRIPTION ENCOUNTER (OUTPATIENT)
Age: 32
End: 2022-12-20

## 2022-12-20 ENCOUNTER — APPOINTMENT (OUTPATIENT)
Dept: ORTHOPEDIC SURGERY | Facility: HOSPITAL | Age: 32
End: 2022-12-20

## 2022-12-20 ENCOUNTER — INPATIENT (INPATIENT)
Facility: HOSPITAL | Age: 32
LOS: 0 days | Discharge: HOME | End: 2022-12-20
Admitting: INTERNAL MEDICINE

## 2022-12-20 VITALS
RESPIRATION RATE: 14 BRPM | DIASTOLIC BLOOD PRESSURE: 86 MMHG | OXYGEN SATURATION: 98 % | HEART RATE: 87 BPM | SYSTOLIC BLOOD PRESSURE: 140 MMHG

## 2022-12-20 VITALS
OXYGEN SATURATION: 98 % | RESPIRATION RATE: 19 BRPM | DIASTOLIC BLOOD PRESSURE: 65 MMHG | HEART RATE: 107 BPM | TEMPERATURE: 99 F | HEIGHT: 67 IN | SYSTOLIC BLOOD PRESSURE: 120 MMHG | WEIGHT: 145.06 LBS

## 2022-12-20 DIAGNOSIS — Z02.9 ENCOUNTER FOR ADMINISTRATIVE EXAMINATIONS, UNSPECIFIED: ICD-10-CM

## 2022-12-20 LAB
ALBUMIN SERPL ELPH-MCNC: 4 G/DL — SIGNIFICANT CHANGE UP (ref 3.5–5.2)
ALP SERPL-CCNC: 76 U/L — SIGNIFICANT CHANGE UP (ref 30–115)
ALT FLD-CCNC: 11 U/L — SIGNIFICANT CHANGE UP (ref 0–41)
ANION GAP SERPL CALC-SCNC: 14 MMOL/L — SIGNIFICANT CHANGE UP (ref 7–14)
APTT BLD: 24.1 SEC — LOW (ref 27–39.2)
AST SERPL-CCNC: 21 U/L — SIGNIFICANT CHANGE UP (ref 0–41)
BASOPHILS # BLD AUTO: 0.05 K/UL — SIGNIFICANT CHANGE UP (ref 0–0.2)
BASOPHILS NFR BLD AUTO: 0.8 % — SIGNIFICANT CHANGE UP (ref 0–1)
BILIRUB SERPL-MCNC: 0.3 MG/DL — SIGNIFICANT CHANGE UP (ref 0.2–1.2)
BLD GP AB SCN SERPL QL: SIGNIFICANT CHANGE UP
BUN SERPL-MCNC: 6 MG/DL — LOW (ref 10–20)
CALCIUM SERPL-MCNC: 10.1 MG/DL — SIGNIFICANT CHANGE UP (ref 8.4–10.5)
CHLORIDE SERPL-SCNC: 100 MMOL/L — SIGNIFICANT CHANGE UP (ref 98–110)
CO2 SERPL-SCNC: 26 MMOL/L — SIGNIFICANT CHANGE UP (ref 17–32)
CREAT SERPL-MCNC: 0.8 MG/DL — SIGNIFICANT CHANGE UP (ref 0.7–1.5)
EGFR: 120 ML/MIN/1.73M2 — SIGNIFICANT CHANGE UP
EOSINOPHIL # BLD AUTO: 0.32 K/UL — SIGNIFICANT CHANGE UP (ref 0–0.7)
EOSINOPHIL NFR BLD AUTO: 5.2 % — SIGNIFICANT CHANGE UP (ref 0–8)
GLUCOSE SERPL-MCNC: 101 MG/DL — HIGH (ref 70–99)
HCG SERPL QL: NEGATIVE — SIGNIFICANT CHANGE UP
HCT VFR BLD CALC: 33.9 % — LOW (ref 42–52)
HGB BLD-MCNC: 11.3 G/DL — LOW (ref 14–18)
IMM GRANULOCYTES NFR BLD AUTO: 0.2 % — SIGNIFICANT CHANGE UP (ref 0.1–0.3)
INR BLD: 1.09 RATIO — SIGNIFICANT CHANGE UP (ref 0.65–1.3)
LYMPHOCYTES # BLD AUTO: 1.57 K/UL — SIGNIFICANT CHANGE UP (ref 1.2–3.4)
LYMPHOCYTES # BLD AUTO: 25.7 % — SIGNIFICANT CHANGE UP (ref 20.5–51.1)
MCHC RBC-ENTMCNC: 31.1 PG — HIGH (ref 27–31)
MCHC RBC-ENTMCNC: 33.3 G/DL — SIGNIFICANT CHANGE UP (ref 32–37)
MCV RBC AUTO: 93.4 FL — SIGNIFICANT CHANGE UP (ref 80–94)
MONOCYTES # BLD AUTO: 0.69 K/UL — HIGH (ref 0.1–0.6)
MONOCYTES NFR BLD AUTO: 11.3 % — HIGH (ref 1.7–9.3)
NEUTROPHILS # BLD AUTO: 3.48 K/UL — SIGNIFICANT CHANGE UP (ref 1.4–6.5)
NEUTROPHILS NFR BLD AUTO: 56.8 % — SIGNIFICANT CHANGE UP (ref 42.2–75.2)
NRBC # BLD: 0 /100 WBCS — SIGNIFICANT CHANGE UP (ref 0–0)
PLATELET # BLD AUTO: 230 K/UL — SIGNIFICANT CHANGE UP (ref 130–400)
POTASSIUM SERPL-MCNC: 4.3 MMOL/L — SIGNIFICANT CHANGE UP (ref 3.5–5)
POTASSIUM SERPL-SCNC: 4.3 MMOL/L — SIGNIFICANT CHANGE UP (ref 3.5–5)
PROT SERPL-MCNC: 6.6 G/DL — SIGNIFICANT CHANGE UP (ref 6–8)
PROTHROM AB SERPL-ACNC: 12.5 SEC — SIGNIFICANT CHANGE UP (ref 9.95–12.87)
RBC # BLD: 3.63 M/UL — LOW (ref 4.7–6.1)
RBC # FLD: 11.3 % — LOW (ref 11.5–14.5)
SARS-COV-2 RNA SPEC QL NAA+PROBE: SIGNIFICANT CHANGE UP
SODIUM SERPL-SCNC: 140 MMOL/L — SIGNIFICANT CHANGE UP (ref 135–146)
WBC # BLD: 6.12 K/UL — SIGNIFICANT CHANGE UP (ref 4.8–10.8)
WBC # FLD AUTO: 6.12 K/UL — SIGNIFICANT CHANGE UP (ref 4.8–10.8)

## 2022-12-20 PROCEDURE — 99285 EMERGENCY DEPT VISIT HI MDM: CPT

## 2022-12-20 RX ORDER — MAGNESIUM HYDROXIDE 400 MG/1
30 TABLET, CHEWABLE ORAL DAILY
Refills: 0 | Status: DISCONTINUED | OUTPATIENT
Start: 2022-12-20 | End: 2022-12-20

## 2022-12-20 RX ORDER — OXYCODONE HYDROCHLORIDE 5 MG/1
10 TABLET ORAL EVERY 6 HOURS
Refills: 0 | Status: DISCONTINUED | OUTPATIENT
Start: 2022-12-20 | End: 2022-12-20

## 2022-12-20 RX ORDER — ONDANSETRON 8 MG/1
4 TABLET, FILM COATED ORAL EVERY 6 HOURS
Refills: 0 | Status: DISCONTINUED | OUTPATIENT
Start: 2022-12-20 | End: 2022-12-20

## 2022-12-20 RX ORDER — ACETAMINOPHEN 500 MG
650 TABLET ORAL EVERY 6 HOURS
Refills: 0 | Status: DISCONTINUED | OUTPATIENT
Start: 2022-12-20 | End: 2022-12-20

## 2022-12-20 RX ORDER — CEFAZOLIN SODIUM 1 G
2000 VIAL (EA) INJECTION EVERY 8 HOURS
Refills: 0 | Status: DISCONTINUED | OUTPATIENT
Start: 2022-12-20 | End: 2022-12-20

## 2022-12-20 RX ORDER — THIAMINE MONONITRATE (VIT B1) 100 MG
100 TABLET ORAL ONCE
Refills: 0 | Status: COMPLETED | OUTPATIENT
Start: 2022-12-20 | End: 2022-12-20

## 2022-12-20 RX ORDER — OXYCODONE HYDROCHLORIDE 5 MG/1
5 CAPSULE ORAL
Qty: 40 | Refills: 0 | Status: DISCONTINUED | COMMUNITY
Start: 2022-12-20 | End: 2022-12-20

## 2022-12-20 RX ORDER — FAMOTIDINE 10 MG/ML
1 INJECTION INTRAVENOUS
Qty: 0 | Refills: 0 | DISCHARGE

## 2022-12-20 RX ORDER — POLYETHYLENE GLYCOL 3350 17 G/17G
17 POWDER, FOR SOLUTION ORAL AT BEDTIME
Refills: 0 | Status: DISCONTINUED | OUTPATIENT
Start: 2022-12-20 | End: 2022-12-20

## 2022-12-20 RX ORDER — TRAMADOL HYDROCHLORIDE 50 MG/1
50 TABLET ORAL EVERY 6 HOURS
Refills: 0 | Status: DISCONTINUED | OUTPATIENT
Start: 2022-12-20 | End: 2022-12-20

## 2022-12-20 RX ORDER — ACETAMINOPHEN 500 MG/1
500 CAPSULE, LIQUID FILLED ORAL 4 TIMES DAILY
Qty: 50 | Refills: 0 | Status: COMPLETED | COMMUNITY
Start: 2022-12-20

## 2022-12-20 RX ORDER — ACETAMINOPHEN 500 MG/1
500 TABLET ORAL
Qty: 50 | Refills: 0 | Status: ACTIVE | COMMUNITY
Start: 2022-12-20 | End: 1900-01-01

## 2022-12-20 RX ORDER — MEPERIDINE HYDROCHLORIDE 50 MG/ML
12.5 INJECTION INTRAMUSCULAR; INTRAVENOUS; SUBCUTANEOUS
Refills: 0 | Status: DISCONTINUED | OUTPATIENT
Start: 2022-12-20 | End: 2022-12-20

## 2022-12-20 RX ORDER — ENOXAPARIN SODIUM 100 MG/ML
40 INJECTION SUBCUTANEOUS EVERY 24 HOURS
Refills: 0 | Status: CANCELLED | OUTPATIENT
Start: 2022-12-21 | End: 2022-12-20

## 2022-12-20 RX ORDER — OXYCODONE 5 MG/1
5 TABLET ORAL EVERY 6 HOURS
Qty: 28 | Refills: 0 | Status: ACTIVE | COMMUNITY
Start: 2022-12-20 | End: 1900-01-01

## 2022-12-20 RX ORDER — FAMOTIDINE 10 MG/ML
20 INJECTION INTRAVENOUS DAILY
Refills: 0 | Status: DISCONTINUED | OUTPATIENT
Start: 2022-12-20 | End: 2022-12-20

## 2022-12-20 RX ORDER — CLONAZEPAM 1 MG
1 TABLET ORAL DAILY
Refills: 0 | Status: DISCONTINUED | OUTPATIENT
Start: 2022-12-20 | End: 2022-12-20

## 2022-12-20 RX ORDER — FOLIC ACID 0.8 MG
1 TABLET ORAL DAILY
Refills: 0 | Status: DISCONTINUED | OUTPATIENT
Start: 2022-12-20 | End: 2022-12-20

## 2022-12-20 RX ORDER — SENNA PLUS 8.6 MG/1
2 TABLET ORAL AT BEDTIME
Refills: 0 | Status: DISCONTINUED | OUTPATIENT
Start: 2022-12-20 | End: 2022-12-20

## 2022-12-20 RX ORDER — ENOXAPARIN SODIUM 100 MG/ML
40 INJECTION SUBCUTANEOUS EVERY 24 HOURS
Refills: 0 | Status: DISCONTINUED | OUTPATIENT
Start: 2022-12-21 | End: 2022-12-20

## 2022-12-20 RX ORDER — ASPIRIN 325 MG/1
325 TABLET, FILM COATED ORAL DAILY
Qty: 30 | Refills: 0 | Status: DISCONTINUED | COMMUNITY
Start: 2022-12-20 | End: 2022-12-20

## 2022-12-20 RX ORDER — ACETAMINOPHEN EXTRA STRENGTH 500 MG/1
500 TABLET ORAL
Qty: 60 | Refills: 0 | Status: DISCONTINUED | COMMUNITY
Start: 2022-12-20 | End: 2022-12-20

## 2022-12-20 RX ORDER — OXYCODONE HYDROCHLORIDE 5 MG/1
5 TABLET ORAL EVERY 6 HOURS
Refills: 0 | Status: DISCONTINUED | OUTPATIENT
Start: 2022-12-20 | End: 2022-12-20

## 2022-12-20 RX ORDER — ASPIRIN 325 MG/1
325 TABLET, FILM COATED ORAL DAILY
Qty: 30 | Refills: 0 | Status: ACTIVE | COMMUNITY
Start: 2022-12-20 | End: 1900-01-01

## 2022-12-20 RX ORDER — PREGABALIN 225 MG/1
1000 CAPSULE ORAL ONCE
Refills: 0 | Status: COMPLETED | OUTPATIENT
Start: 2022-12-20 | End: 2022-12-20

## 2022-12-20 RX ORDER — HYDROMORPHONE HYDROCHLORIDE 2 MG/ML
1 INJECTION INTRAMUSCULAR; INTRAVENOUS; SUBCUTANEOUS
Refills: 0 | Status: DISCONTINUED | OUTPATIENT
Start: 2022-12-20 | End: 2022-12-20

## 2022-12-20 RX ORDER — HYDROMORPHONE HYDROCHLORIDE 2 MG/ML
0.5 INJECTION INTRAMUSCULAR; INTRAVENOUS; SUBCUTANEOUS
Refills: 0 | Status: DISCONTINUED | OUTPATIENT
Start: 2022-12-20 | End: 2022-12-20

## 2022-12-20 RX ORDER — OXYCODONE 5 MG/1
5 TABLET ORAL
Qty: 28 | Refills: 0 | Status: COMPLETED | COMMUNITY
Start: 2022-12-20

## 2022-12-20 RX ORDER — ONDANSETRON 8 MG/1
4 TABLET, FILM COATED ORAL ONCE
Refills: 0 | Status: DISCONTINUED | OUTPATIENT
Start: 2022-12-20 | End: 2022-12-20

## 2022-12-20 RX ORDER — SODIUM CHLORIDE 9 MG/ML
1000 INJECTION, SOLUTION INTRAVENOUS
Refills: 0 | Status: DISCONTINUED | OUTPATIENT
Start: 2022-12-20 | End: 2022-12-20

## 2022-12-20 RX ADMIN — MEPERIDINE HYDROCHLORIDE 12.5 MILLIGRAM(S): 50 INJECTION INTRAMUSCULAR; INTRAVENOUS; SUBCUTANEOUS at 20:00

## 2022-12-20 RX ADMIN — PREGABALIN 1000 MICROGRAM(S): 225 CAPSULE ORAL at 12:27

## 2022-12-20 RX ADMIN — HYDROMORPHONE HYDROCHLORIDE 0.5 MILLIGRAM(S): 2 INJECTION INTRAMUSCULAR; INTRAVENOUS; SUBCUTANEOUS at 20:41

## 2022-12-20 RX ADMIN — Medication 1 MILLIGRAM(S): at 12:27

## 2022-12-20 RX ADMIN — HYDROMORPHONE HYDROCHLORIDE 1 MILLIGRAM(S): 2 INJECTION INTRAMUSCULAR; INTRAVENOUS; SUBCUTANEOUS at 19:10

## 2022-12-20 RX ADMIN — HYDROMORPHONE HYDROCHLORIDE 0.5 MILLIGRAM(S): 2 INJECTION INTRAMUSCULAR; INTRAVENOUS; SUBCUTANEOUS at 20:30

## 2022-12-20 RX ADMIN — HYDROMORPHONE HYDROCHLORIDE 1 MILLIGRAM(S): 2 INJECTION INTRAMUSCULAR; INTRAVENOUS; SUBCUTANEOUS at 19:28

## 2022-12-20 RX ADMIN — HYDROMORPHONE HYDROCHLORIDE 1 MILLIGRAM(S): 2 INJECTION INTRAMUSCULAR; INTRAVENOUS; SUBCUTANEOUS at 19:03

## 2022-12-20 RX ADMIN — MEPERIDINE HYDROCHLORIDE 12.5 MILLIGRAM(S): 50 INJECTION INTRAMUSCULAR; INTRAVENOUS; SUBCUTANEOUS at 19:25

## 2022-12-20 RX ADMIN — Medication 650 MILLIGRAM(S): at 14:06

## 2022-12-20 RX ADMIN — Medication 100 MILLIGRAM(S): at 12:29

## 2022-12-20 RX ADMIN — Medication 650 MILLIGRAM(S): at 12:00

## 2022-12-20 RX ADMIN — FAMOTIDINE 20 MILLIGRAM(S): 10 INJECTION INTRAVENOUS at 12:28

## 2022-12-20 RX ADMIN — MEPERIDINE HYDROCHLORIDE 12.5 MILLIGRAM(S): 50 INJECTION INTRAMUSCULAR; INTRAVENOUS; SUBCUTANEOUS at 19:15

## 2022-12-20 RX ADMIN — MEPERIDINE HYDROCHLORIDE 12.5 MILLIGRAM(S): 50 INJECTION INTRAMUSCULAR; INTRAVENOUS; SUBCUTANEOUS at 19:38

## 2022-12-20 NOTE — H&P ADULT - ASSESSMENT
32y Male with right ankle kory fracture    NWB RLE   Added on for  right ankle ORIF vs Ex-fix  NPO   Preop labs: CBC, BMP, PT/INR, PTT, T&S x2, CXR, EKG, COVID test  DVT ppx: LVX post op  Pain control  PT postoperatively  Call ortho with any questions [phone]

## 2022-12-20 NOTE — DISCHARGE NOTE PROVIDER - NSDCFUSCHEDAPPT_GEN_ALL_CORE_FT
Geremias Nava Physician [facility]  [address]  Scheduled Appointment: 01/06/2023

## 2022-12-20 NOTE — DISCHARGE NOTE PROVIDER - NSDCFUADDINST_GEN_ALL_CORE_FT
- Nonweight bearing to right lower extremity in splint  - Keep splint clean, dry, and intact   - Take aspirin 325 mg daily for DVT prophylaxis   - Take pain medications as needed and as prescribed   - Follow up with Dr. Nava in 2 weeks   - Return to ED for any fevers, uncontrolled pain, numbness or other concerning symptoms

## 2022-12-20 NOTE — ED ADULT TRIAGE NOTE - CCCP TRG CHIEF CMPLNT
Protocol For Photochemotherapy: Mineral Oil And Nbuvb: The patient received Photochemotherapy: Mineral Oil and NBUVB (mineral oil applied to all lesions prior to phototherapy). Render Post-Care In The Note: no Protocol For Uva: The patient received UVA. Protocol For Photochemotherapy: Petrolatum And Nbuvb: The patient received Photochemotherapy: Petrolatum and NBUVB (petrolatum applied to all lesions prior to phototherapy). Detail Level: Zone Protocol For Nbuvb: The patient received NBUVB. Protocol For Photochemotherapy: Mineral Oil And Broad Band Uvb: The patient received Photochemotherapy: Mineral Oil and Broad Band UVB. Name Of Supervising Technician: NELI Protocol For Photochemotherapy For Severe Photoresponsive Dermatoses: Tar And Nbuvb (Goeckerman Treatment): The patient received Photochemotherapy for severe photoresponsive dermatoses: Tar and NBUVB (Goeckerman treatment) requiring at least 4 to 8 hours of care under direct physician supervision. Consent: Written consent obtained.  The risks were reviewed with the patient including but not limited to: burn, pigmentary changes, pain, blistering, scabbing, redness, increased risk of skin cancers, and the remote possibility of scarring. Protocol For Photochemotherapy: Tar And Broad Band Uvb (Goeckerman Treatment): The patient received Photochemotherapy: Tar and Broad Band UVB (Goeckerman treatment). Protocol For Photochemotherapy: Petrolatum And Broad Band Uvb: The patient received Photochemotherapy: Petrolatum and Broad Band UVB. Protocol For Protocol For Photochemotherapy For Severe Photoresponsive Dermatoses: Bath Puva: The patient received Photochemotherapy for severe photoresponsive dermatoses: Bath PUVA requiring at least 4 to 8 hours of care under direct physician supervision. Protocol For Photochemotherapy For Severe Photoresponsive Dermatoses: Tar And Broad Band Uvb (Goeckerman Treatment): The patient received Photochemotherapy for severe photoresponsive dermatoses: Tar and Broad Band UVB (Goeckerman treatment) requiring at least 4 to 8 hours of care under direct physician supervision. Total Body Energy: 2061 Protocol: NBUVB Protocol For Photochemotherapy: Tar And Nbuvb (Goeckerman Treatment): The patient received Photochemotherapy: Tar and NBUVB (Goeckerman treatment). Protocol For Photochemotherapy: Triamcinolone Ointment And Nbuvb: The patient received Photochemotherapy: Triamcinolone and NBUVB (triamcinolone ointment applied to all lesions prior to phototherapy). Protocol For Photochemotherapy For Severe Photoresponsive Dermatoses: Petrolatum And Nbuvb: The patient received Photochemotherapy for severe photoresponsive dermatoses: Petrolatum and NBUVB requiring at least 4 to 8 hours of care under direct physician supervision. Protocol For Uva1: The patient received UVA1. Protocol For Photochemotherapy For Severe Photoresponsive Dermatoses: Puva: The patient received Photochemotherapy for severe photoresponsive dermatoses: PUVA requiring at least 4 to 8 hours of care under direct physician supervision. Protocol For Broad Band Uvb: The patient received Broad Band UVB. Post-Care Instructions: I reviewed with the patient in detail post-care instructions. Patient is to wear sun protection. Patients may expect sunburn like redness, discomfort and scabbing. Protocol For Nb Uva: The patient received NB UVA. Protocol For Photochemotherapy For Severe Photoresponsive Dermatoses: Petrolatum And Broad Band Uvb: The patient received Photochemotherapyfor severe photoresponsive dermatoses: Petrolatum and Broad Band UVB requiring at least 4 to 8 hours of care under direct physician supervision. Protocol For Bath Puva: The patient received Bath PUVA. Protocol For Photochemotherapy: Baby Oil And Nbuvb: The patient received Photochemotherapy: Baby Oil and NBUVB (baby oil applied to all lesions prior to phototherapy). Total Body Time: 3:54 Protocol For Puva: The patient received PUVA. pre-op testing/ankle pain/injury

## 2022-12-20 NOTE — H&P ADULT - ATTENDING COMMENTS
agree with above agree with above    32M with unstable R ankle fracture. Discussed goals of surgery to stabilize fracture, earlier WB, and better healing, Discussed risks including infection, wound healing, nonunion, malunion, symptomatic hardware, hardware failure, refracture, nerve injury, vascular injury, persistent pain, post traumatic arthritis, organ damage, and death. Patient understood. Discussion was with patient and his wife. Patient signed informed consent. Plan proceed to OR

## 2022-12-20 NOTE — ASU DISCHARGE PLAN (ADULT/PEDIATRIC) - WILL THE PATIENT ACCEPT THE PFIZER COVID-19 VACCINE IF ELIGIBLE AND IT IS AVAILABLE?
Admitting History and Physical





- Admission


Chief Complaint: 77 y.o M [facility] resident with history of MM on Revlimid was 

found to have worsening anemia with HGB 6.7/HCT 20 . Sent to the ER [facility] for 

PRBC TX.


History of Present Illness: 





MM ON REVLIMID BUT RECENTLY INCREASING SPEP/IF/LC.


Leukopenia, anemia


RECENT PET/CT SHOWED NEW C7 HYPERMETABOLIC LESION, F?U MRI C-spine DDD.


COPD WITH RECENT EXACERBATION


STILL SMOKES


PARKINSON'S


DEMENTIA


HTN, EKG WITH FREQUENT APC/VPC


CKD 2-3


REPEATED FALLS 





History Source: Medical Record


Limitations to Obtaining History: No Limitations





- Past Medical History


CNS: Yes: Parkinson's


Cardiovascular: Yes: HTN


Pulmonary: Yes: COPD, Other (copd)


Renal/: Yes: Renal Failure





- Smoking History


Smoking history: Current some day smoker


Have you smoked in the past 12 months: No


Aproximately how many cigarettes per day: 1





- Alcohol/Substance Use


Hx Alcohol Use: No


History of Substance Use: reports: None





- Social History


ADL: Support Services


History of Recent Travel: No





Home Medications





- Allergies


Allergies/Adverse Reactions: 


 Allergies











Allergy/AdvReac Type Severity Reaction Status Date / Time


 


No Known Allergies Allergy   Verified 09/18/19 09:47














- Home Medications


Home Medications: 


Ambulatory Orders





Docusate Sodium [Colace -] 100 mg PO BID PRN #28 capsule 12/12/16 


Acetaminophen 650 mg PO QID PRN 02/11/19 


Carbidopa/Levodopa *Cr* 50/200 [Sinemet *Cr* 50/200 -] 1 combo PO TID 02/11/19 


Cyanocobalamin Vit B-12 Inj. [Vitamin B12 Injection -] 1,000 mcg IM MONTHLY 02/ 11/19 


Multivitamin,Ther and Minerals [Vitamin and Minerals] 1 each PO DAILY 02/11/19 


Ipratropium/Albuterol Sulfate [Iprat-Albut 0.5-3(2.5) mg/3 ml] 3 ml IH PRN 09/18 /19 











Family Disease History





- Family Disease History


Family History: Unremarkable





Review of Systems





- Review of Systems


Constitutional: denies: Chills, Diaphoresis, Fever


Eyes: denies: Blind Spots, Blurred Vision, Double Vision, Photophobia


HENT: denies: Difficult Swallowing, Ear Discharge


Neck: denies: Decreased ROM, Lumps, Pain on Movement


Cardiovascular: reports: Edema (LE).  denies: Chest Pain


Respiratory: reports: Cough, Exercise Intolerance, SOB on Exertion


Gastrointestinal: denies: Abdominal Pain, Bloating, Constipation


Genitourinary: denies: Burning, Discharge


Breasts: reports: No Symptoms Reported


Musculoskeletal: reports: Back Pain


Integumentary: denies: Blister, Bruising


Neurological: reports: Dizziness, Unsteady Gait.  denies: Change in LOC, 

Parasthesia, Pre-Existing Deficit, Seizure


Endocrine: reports: No Symptoms


Hematology/Lymphatic: reports: No Symptoms


Psychiatric: reports: No Symptoms





Physical Examination


Vital Signs: 


 Vital Signs











Temperature  97.6 F   09/18/19 13:55


 


Pulse Rate  73   09/18/19 13:55


 


Respiratory Rate  18   09/18/19 13:55


 


Blood Pressure  174/75 H  09/18/19 13:55


 


O2 Sat by Pulse Oximetry (%)  95   09/18/19 13:55











Constitutional: Yes: No Distress, Calm


Eyes: Yes: Conjunctiva Clear, EOM Intact


HENT: Yes: Atraumatic, Normocephalic.  No: Drooling


Neck: Yes: Supple, Trachea Midline


Cardiovascular: Yes: Pulse Irregular (APC/VPC)


Respiratory: Yes: Regular, CTA Bilaterally


Gastrointestinal: Yes: Normal Bowel Sounds, Soft, Abdomen, Obese


...Rectal Exam: Yes: Deferred


Renal/: No: Anuria, Bladder Distention


Breast(s): Yes: WNL


Musculoskeletal: No: Joint Stiffness, Joint Swelling


Extremities: No: Amputation


Edema: LLE: 1+, RLE: Trace


Integumentary: Yes: WNL


Neurological: Yes: Alert, Oriented.  No: Aphasia


...Motor Strength: WNL


Psychiatric: Yes: WNL


Labs: 


 CBC, BMP





 09/18/19 10:20 





 09/18/19 10:20 











Imaging





- Results


Chest X-ray: Report Reviewed


Ultrasound: Report Reviewed (NO DVT LLE)





Problem List





- Problems


(1) Anemia


Assessment/Plan: 


Iron studies noted Ferritin > 200, unlikely iron deficiency


TX v2 units PRBC


CBC in AM


Code(s): D64.9 - ANEMIA, UNSPECIFIED   


Qualifiers: 


   Anemia type: other cause   Other causes of anemia: other cause, not 

classified   Qualified Code(s): D64.89 - Other specified anemias   





(2) COPD (chronic obstructive pulmonary disease)


Assessment/Plan: 


Nebs PRN Duonebs


Code(s): J44.9 - CHRONIC OBSTRUCTIVE PULMONARY DISEASE, UNSPECIFIED   


Qualifiers: 


   COPD type: emphysema 





(3) Multiple myeloma not having achieved remission


Assessment/Plan: 


Dr Crowell f/u


Code(s): C90.00 - MULTIPLE MYELOMA NOT HAVING ACHIEVED REMISSION   





(4) Acute renal insufficiency


Assessment/Plan: 


Significantly imroved creat due to d/c diuretics, ARB.


Code(s): N28.9 - DISORDER OF KIDNEY AND URETER, UNSPECIFIED   





(5) HTN (hypertension)


Assessment/Plan: 


Start Amlodipine 5 mg QD and F/U BP


Code(s): I10 - ESSENTIAL (PRIMARY) HYPERTENSION   


Qualifiers: 


   Hypertension type: essential hypertension   Qualified Code(s): I10 - 

Essential (primary) hypertension No

## 2022-12-20 NOTE — H&P ADULT - HISTORY OF PRESENT ILLNESS
Patient is a 32y year old Male with right bimalleolar fracture from 12/9, which was closed reduced by ED. Seen in clinic on 12/14 and XRs demonstrated displacement. Patient was sent to ED and re-reduction was performed. Patient was seen in outpatient clinic which demonstrated loss of reduction. Patient presents today for right ankle ORIF vs ex-fix. Denies pain or injury elsewhere. Denies paresthesias

## 2022-12-20 NOTE — BRIEF OPERATIVE NOTE - OPERATION/FINDINGS
Severe osteoporosis. Comminuted distal fibula fracture. Medial malleolus fracture.    Implants:  Arthrex 6 hole lateral locking plate. 3 locking distal fibular screws, 4 proximal cortical screws  2 partially threaded cannulated 4.0mm medial malleolar screws     Syndesmosis stable to stress examination

## 2022-12-20 NOTE — ED PROVIDER NOTE - PHYSICAL EXAMINATION
PHYSICAL EXAM: I have reviewed current vital signs.  GENERAL: NAD, well-nourished; well-developed.  HEAD:  Normocephalic, atraumatic.  EYES: Conjunctiva and sclera clear.  ENT: MMM, no erythema/exudates.  NECK: Supple, no JVD.  CHEST/LUNG: Clear to auscultation bilaterally; no wheezes, rales, or rhonchi.  HEART: Regular rate and rhythm, normal S1 and S2; no murmurs, rubs, or gallops.  ABDOMEN: Soft, nontender, nondistended.  EXTREMITIES:  Casted right lower extremity.   PSYCH: Cooperative, appropriate, normal mood and affect.  NEUROLOGY: A&O x 3. No focal neurological deficits.   SKIN: Warm and dry.

## 2022-12-20 NOTE — ASU DISCHARGE PLAN (ADULT/PEDIATRIC) - PAIN MANAGEMENT
GYN Annual Exam     CC- Here for annual exam.     Rabia Olmos is a 44 y.o. female est pt  who presents for annual well woman exam. Pt had a hysterectomy with ovarian conservation in  for cervical dysplasia. She had no adjuvant treatment. Her pap/HPV were normal last year.    OB History      Para Term  AB TAB SAB Ectopic Multiple Living    2 2 1 1      2        Obstetric Comments    1   1 C/S at 27 weeks          Menarche: 15  Current contraception: status post hysterectomy  History of abnormal Pap smear: yes - had LORA for cervical dysplasia and limited follow up  History of abnormal mammogram: no  Family history of uterine, colon or ovarian cancer: no  Family history of breast cancer: yes - paternal aunt , dx < 50  H/o STDs: none    Health Maintenance   Topic Date Due   • TDAP/TD VACCINES (1 - Tdap) 1991   • PAP SMEAR  2017   • INFLUENZA VACCINE  2017       Past Medical History:   Diagnosis Date   • Abnormal Pap smear of cervix    • Cervical dysplasia    • Hot flashes    • MVP (mitral valve prolapse)        Past Surgical History:   Procedure Laterality Date   • APPENDECTOMY     •  SECTION     • HERNIA REPAIR      R inguinal hernia repair with mesh   • HYSTERECTOMY      Lora with ovarian conservation   • KNEE SURGERY           Current Outpatient Prescriptions:   •  Multiple Vitamin (ONE-A-DAY ESSENTIAL PO), Take  by mouth., Disp: , Rfl:   •  fluticasone (FLONASE) 50 MCG/ACT nasal spray, , Disp: , Rfl:     No Known Allergies    Social History   Substance Use Topics   • Smoking status: Current Every Day Smoker     Types: Cigarettes   • Smokeless tobacco: Never Used   • Alcohol use No       Family History   Problem Relation Age of Onset   • Breast cancer Paternal Aunt    • Ovarian cancer Neg Hx    • Colon cancer Neg Hx        Review of Systems   Constitutional: Negative for appetite change, fatigue, fever and unexpected weight change.   Respiratory: Negative for cough  "and shortness of breath.    Cardiovascular: Negative for chest pain and palpitations.   Gastrointestinal: Negative for abdominal distention, abdominal pain, constipation, diarrhea and nausea.   Genitourinary: Positive for dyspareunia. Negative for dysuria, menstrual problem and vaginal discharge. Pelvic pain: RLQ pain with sex.   Skin: Negative for color change and rash.   Neurological: Negative for headaches.   Psychiatric/Behavioral: Negative for dysphoric mood. The patient is not nervous/anxious.        /60  Ht 64\" (162.6 cm)  Wt 134 lb (60.8 kg)  Breastfeeding? No  BMI 23 kg/m2    Physical Exam   Constitutional: She is oriented to person, place, and time. She appears well-developed and well-nourished.   HENT:   Head: Normocephalic and atraumatic.   Neck: No thyromegaly present.   Cardiovascular: Normal rate and regular rhythm.    Pulmonary/Chest: Effort normal and breath sounds normal. Right breast exhibits no inverted nipple, no mass, no nipple discharge, no skin change and no tenderness. Left breast exhibits no inverted nipple, no mass, no nipple discharge, no skin change and no tenderness.   Abdominal: Soft. Bowel sounds are normal. She exhibits no distension and no mass. There is no tenderness. No hernia.   Genitourinary: Pelvic exam was performed with patient supine. There is no rash, tenderness or lesion on the right labia. There is no rash, tenderness or lesion on the left labia. Right adnexum displays tenderness. Right adnexum displays no mass and no fullness. Left adnexum displays no mass, no tenderness and no fullness. No erythema, tenderness or bleeding in the vagina. No signs of injury around the vagina. No vaginal discharge found.   Genitourinary Comments: Normal cuff  TTP in RLQ over hernia repair   Neurological: She is oriented to person, place, and time.   Skin: Skin is warm and dry.   Psychiatric: She has a normal mood and affect. Her behavior is normal. Judgment and thought content " normal.   Vitals reviewed.         Assessment/Plan    1) GYN HM: normal pap/HPV 7/2016   SBE demonstrated and encouraged.  2) STD screening: declines Condoms encouraged.  3) Contraception: s/p JT for dysplasia  4) Family Planning: no plans  5) Diet and Exercise discussed  6) Smoking Status: I advised the patient of the risks in continuing to use tobacco, and I provided this patient with smoking cessation educational materials.  I also discussed how to quit smoking and the patient has expressed the willingness to quit.    During this visit, I spent 3-10 mintues counseling the patient regarding smoking cessation. We also discussed how smoking and abnormal paps are related.  7) Social: , no issues  8) MMG-  schedule >  8/23/17  9)Follow up prn or 1 year  10) RLQ pain with sex- pt has had normal TVUS and normal CT scan of this area. She does have some vaginismus and is agreeable to seeing Karol Vallejo PT. Referral made.       Rabia was seen today for gynecologic exam.    Diagnoses and all orders for this visit:    Screening  -     POC Urinalysis Dipstick  -     Ambulatory Referral to Physical Therapy Pelvic Floor  -     Mammo Screening Bilateral With CAD; Future    Encounter for gynecological examination without abnormal finding          Ela Lugo MD  7/24/2017  12:01 PM     Prescription called to pharmacy

## 2022-12-20 NOTE — ED PROVIDER NOTE - CLINICAL SUMMARY MEDICAL DECISION MAKING FREE TEXT BOX
32 year old male presented to the ED for admission for operative procedure. Patient is well appearing, endorses no complaints at this time. Orthopedics consulted, pre-op labs drawn to be followed by Ortho and patient admitted.

## 2022-12-20 NOTE — BRIEF OPERATIVE NOTE - NSICDXBRIEFPROCEDURE_GEN_ALL_CORE_FT
PROCEDURES:  Open reduction and internal fixation of bimalleolar fracture of right ankle 20-Dec-2022 18:20:00  Phoenix Thomas  X-ray of right ankle, manual stress views 20-Dec-2022 18:24:09  Phoenix Thomas

## 2022-12-20 NOTE — ED ADULT NURSE NOTE - NSIMPLEMENTINTERV_GEN_ALL_ED
Implemented All Universal Safety Interventions:  [place] to call system. Call bell, personal items and telephone within reach. Instruct patient to call for assistance. Room bathroom lighting operational. Non-slip footwear when patient is off stretcher. Physically safe environment: no spills, clutter or unnecessary equipment. Stretcher in lowest position, wheels locked, appropriate side rails in place. [place] Pregnancy And Lactation Text: This medication is Pregnancy Category X and contraindicated in pregnancy and in women who may become pregnant. It is unknown if this medication is excreted in breast milk.

## 2022-12-20 NOTE — DISCHARGE NOTE PROVIDER - NSDCCPCAREPLAN_GEN_ALL_CORE_FT
PRINCIPAL DISCHARGE DIAGNOSIS  Diagnosis: Ankle fracture, right  Assessment and Plan of Treatment:

## 2022-12-20 NOTE — ED ADULT NURSE NOTE - OBJECTIVE STATEMENT
Pt complains of right ankle/leg pain. States pain radiating up shin. S/p right ankle fracture x 1.5 weeks ago. Scheduled for surgery today.

## 2022-12-20 NOTE — H&P ADULT - NSHPPHYSICALEXAM_GEN_ALL_CORE
Well appearing  NAD  Breathing comfortably on RA    RLE  Splint c/d/i  Motor: EHL/FHL intact   SILT distally   Foot wwp, bcr

## 2022-12-20 NOTE — ED ADULT NURSE NOTE - CHIEF COMPLAINT QUOTE
" I was told by my ortho doctor to come to ED @ 5 am for covid testing and other tests in preparation for my surgery of my ankle today."

## 2022-12-20 NOTE — DISCHARGE NOTE PROVIDER - HOSPITAL COURSE
Patient is a 32y year old Male with right bimalleolar fracture from 12/9, which was closed reduced by ED. Seen in clinic on 12/14 and XRs demonstrated displacement. Patient was sent to ED and re-reduction was performed. Patient was seen in outpatient clinic which demonstrated loss of reduction. Patient presents today 12/20 and underwent right ankle ORIF. Surgery was successful and without complication. Patient stable and clear for discharge home.

## 2022-12-20 NOTE — ED PROVIDER NOTE - OBJECTIVE STATEMENT
31 yo M with PMH of anxiety, GERD, 33 yo M with PMH of anxiety, GERD, and transitioning to the male gender, presents to the ED for pre-op labs. Patient fell and injured his ankle 1.5 weeks ago and was told yesterday that it was not set well and that he needed to come in for surgery so he was sent to the ED for blood work. Patient does not have any other complaints. Patient has not had fever, headache, dizziness, cough, shortness of breath, chest pain, palpitations, nausea, vomiting, or diarrhea.

## 2022-12-20 NOTE — DISCHARGE NOTE PROVIDER - NSDCMRMEDTOKEN_GEN_ALL_CORE_FT
Adult Aspirin 325 mg oral tablet: 1 tab(s) orally once a day   cyanocobalamin 1000 mcg oral tablet: 1 tab(s) orally once a day  famotidine 20 mg oral tablet: 1 tab(s) orally once a day  famotidine 40 mg oral tablet: 1 tab(s) orally once a day (at bedtime)  folic acid 1 mg oral tablet: 1 tab(s) orally once a day  ibuprofen 600 mg oral tablet: 1 tab(s) orally every 6 hours   ketorolac 10 mg oral tablet: 1 tab(s) orally 3 times a day   KlonoPIN 1 mg oral tablet: 1 tab(s) orally 2 times a day  Testosterone Cypionate 100 mg/mL intramuscular solution: 0.25 milliliter(s) intramuscular every 7 days (home med)  thiamine 100 mg oral tablet: 1 tab(s) orally once a day   vilazodone 10 mg oral tablet: 1 tab(s) orally once a day

## 2022-12-21 RX ORDER — ACETAMINOPHEN 500 MG
2 TABLET ORAL
Qty: 56 | Refills: 0
Start: 2022-12-21 | End: 2022-12-27

## 2022-12-21 RX ORDER — ASPIRIN/CALCIUM CARB/MAGNESIUM 324 MG
1 TABLET ORAL
Qty: 42 | Refills: 0
Start: 2022-12-21 | End: 2023-01-31

## 2022-12-21 RX ORDER — OXYCODONE HYDROCHLORIDE 5 MG/1
1 TABLET ORAL
Qty: 24 | Refills: 0
Start: 2022-12-21 | End: 2022-12-26
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
## 2022-12-27 DIAGNOSIS — F64.0 TRANSSEXUALISM: ICD-10-CM

## 2022-12-27 DIAGNOSIS — Z87.890 PERSONAL HISTORY OF SEX REASSIGNMENT: ICD-10-CM

## 2022-12-27 DIAGNOSIS — Z79.890 HORMONE REPLACEMENT THERAPY: ICD-10-CM
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
## 2022-12-27 DIAGNOSIS — S82.841A DISPLACED BIMALLEOLAR FRACTURE OF RIGHT LOWER LEG, INITIAL ENCOUNTER FOR CLOSED FRACTURE: ICD-10-CM

## 2023-01-13 ENCOUNTER — APPOINTMENT (OUTPATIENT)
Dept: ORTHOPEDIC SURGERY | Facility: CLINIC | Age: 33
End: 2023-01-13
Payer: MEDICAID

## 2023-01-13 PROCEDURE — 73610 X-RAY EXAM OF ANKLE: CPT | Mod: RT

## 2023-01-13 PROCEDURE — 99213 OFFICE O/P EST LOW 20 MIN: CPT | Mod: 24

## 2023-01-13 PROCEDURE — 73562 X-RAY EXAM OF KNEE 3: CPT | Mod: RT

## 2023-01-13 PROCEDURE — 99024 POSTOP FOLLOW-UP VISIT: CPT

## 2023-01-13 PROCEDURE — 73630 X-RAY EXAM OF FOOT: CPT | Mod: RT

## 2023-01-13 NOTE — DISCUSSION/SUMMARY
[de-identified] : 32 years old status post open reduction internal fixation right ankle on December 20, 2022 and a new right great toe fracture.  I removed the patient's stitches today.  I placed Dixon in a cam walker boot.  Dixon is to be nonweightbearing for another 2 weeks.  I did discuss my concern for possible osteoporosis given the mechanism of the fracture as well as the intraoperative findings.  We will order a DEXA scan at a subsequent visit.  I recommended liz taping for great toe fracture.

## 2023-01-13 NOTE — PHYSICAL EXAM
[de-identified] : Right great toe tender to palpation and erythematous\par Right ankle mild periincisional erythema no fluctuance no drainage incision well-healed with buried stitches.

## 2023-01-13 NOTE — HISTORY OF PRESENT ILLNESS
[de-identified] : Dixon is 32 years old and presents to me status post open reduction internal fixation right  ankle fracture on December 20, 2022.  Of note the patient had to push back the first postoperative appointment because of work-related issues.  Since his surgery Dixon has fallen 2 times.  He has now severe pain in his right great toe.  Dixon occasionally puts weight down on the ankle without fully walking on it.

## 2023-01-13 NOTE — DATA REVIEWED
[FreeTextEntry1] : I obtained right knee x-rays.  Right foot AP lateral bleak x-rays.  And right ankle AP lateral and mortise.  The fracture reduction is maintained and the hardware is in appropriate position.  With regards to the patient's right foot there is a great toe fracture.  With regards to the right knee there is no fracture dislocation.

## 2023-02-24 ENCOUNTER — APPOINTMENT (OUTPATIENT)
Dept: ORTHOPEDIC SURGERY | Facility: CLINIC | Age: 33
End: 2023-02-24

## 2023-09-26 ENCOUNTER — EMERGENCY (EMERGENCY)
Facility: HOSPITAL | Age: 33
LOS: 0 days | Discharge: HOME | End: 2023-09-27
Attending: EMERGENCY MEDICINE
Payer: COMMERCIAL

## 2023-09-26 VITALS
HEART RATE: 88 BPM | TEMPERATURE: 98 F | SYSTOLIC BLOOD PRESSURE: 138 MMHG | DIASTOLIC BLOOD PRESSURE: 68 MMHG | RESPIRATION RATE: 20 BRPM | OXYGEN SATURATION: 98 % | WEIGHT: 139.99 LBS

## 2023-09-26 DIAGNOSIS — Z91.013 ALLERGY TO SEAFOOD: ICD-10-CM

## 2023-09-26 DIAGNOSIS — R53.81 OTHER MALAISE: ICD-10-CM

## 2023-09-26 DIAGNOSIS — R11.0 NAUSEA: ICD-10-CM

## 2023-09-26 DIAGNOSIS — R41.82 ALTERED MENTAL STATUS, UNSPECIFIED: ICD-10-CM

## 2023-09-26 DIAGNOSIS — R10.84 GENERALIZED ABDOMINAL PAIN: ICD-10-CM

## 2023-09-26 DIAGNOSIS — R42 DIZZINESS AND GIDDINESS: ICD-10-CM

## 2023-09-26 DIAGNOSIS — E86.0 DEHYDRATION: ICD-10-CM

## 2023-09-26 DIAGNOSIS — Z91.041 RADIOGRAPHIC DYE ALLERGY STATUS: ICD-10-CM

## 2023-09-26 DIAGNOSIS — R10.9 UNSPECIFIED ABDOMINAL PAIN: ICD-10-CM

## 2023-09-26 LAB
APTT BLD: 34.9 SEC — SIGNIFICANT CHANGE UP (ref 27–39.2)
BASOPHILS # BLD AUTO: 0.04 K/UL — SIGNIFICANT CHANGE UP (ref 0–0.2)
BASOPHILS NFR BLD AUTO: 0.4 % — SIGNIFICANT CHANGE UP (ref 0–1)
EOSINOPHIL # BLD AUTO: 0.08 K/UL — SIGNIFICANT CHANGE UP (ref 0–0.7)
EOSINOPHIL NFR BLD AUTO: 0.9 % — SIGNIFICANT CHANGE UP (ref 0–8)
ETHANOL SERPL-MCNC: <10 MG/DL — SIGNIFICANT CHANGE UP
HCT VFR BLD CALC: 45.5 % — SIGNIFICANT CHANGE UP (ref 42–52)
HGB BLD-MCNC: 15.4 G/DL — SIGNIFICANT CHANGE UP (ref 14–18)
IMM GRANULOCYTES NFR BLD AUTO: 0.4 % — HIGH (ref 0.1–0.3)
INR BLD: 1.14 RATIO — SIGNIFICANT CHANGE UP (ref 0.65–1.3)
LACTATE BLDV-MCNC: 2 MMOL/L — SIGNIFICANT CHANGE UP (ref 0.5–2)
LYMPHOCYTES # BLD AUTO: 1.16 K/UL — LOW (ref 1.2–3.4)
LYMPHOCYTES # BLD AUTO: 12.6 % — LOW (ref 20.5–51.1)
MCHC RBC-ENTMCNC: 29.3 PG — SIGNIFICANT CHANGE UP (ref 27–31)
MCHC RBC-ENTMCNC: 33.8 G/DL — SIGNIFICANT CHANGE UP (ref 32–37)
MCV RBC AUTO: 86.7 FL — SIGNIFICANT CHANGE UP (ref 80–94)
MONOCYTES # BLD AUTO: 0.42 K/UL — SIGNIFICANT CHANGE UP (ref 0.1–0.6)
MONOCYTES NFR BLD AUTO: 4.6 % — SIGNIFICANT CHANGE UP (ref 1.7–9.3)
NEUTROPHILS # BLD AUTO: 7.49 K/UL — HIGH (ref 1.4–6.5)
NEUTROPHILS NFR BLD AUTO: 81.1 % — HIGH (ref 42.2–75.2)
NRBC # BLD: 0 /100 WBCS — SIGNIFICANT CHANGE UP (ref 0–0)
PLATELET # BLD AUTO: 250 K/UL — SIGNIFICANT CHANGE UP (ref 130–400)
PMV BLD: 9.3 FL — SIGNIFICANT CHANGE UP (ref 7.4–10.4)
PROTHROM AB SERPL-ACNC: 13.1 SEC — HIGH (ref 9.95–12.87)
RBC # BLD: 5.25 M/UL — SIGNIFICANT CHANGE UP (ref 4.7–6.1)
RBC # FLD: 11.4 % — LOW (ref 11.5–14.5)
TROPONIN T SERPL-MCNC: <0.01 NG/ML — SIGNIFICANT CHANGE UP
WBC # BLD: 9.23 K/UL — SIGNIFICANT CHANGE UP (ref 4.8–10.8)
WBC # FLD AUTO: 9.23 K/UL — SIGNIFICANT CHANGE UP (ref 4.8–10.8)

## 2023-09-26 PROCEDURE — 99285 EMERGENCY DEPT VISIT HI MDM: CPT | Mod: 25

## 2023-09-26 PROCEDURE — 83605 ASSAY OF LACTIC ACID: CPT

## 2023-09-26 PROCEDURE — 99285 EMERGENCY DEPT VISIT HI MDM: CPT

## 2023-09-26 PROCEDURE — 36415 COLL VENOUS BLD VENIPUNCTURE: CPT

## 2023-09-26 PROCEDURE — 70450 CT HEAD/BRAIN W/O DYE: CPT | Mod: MA

## 2023-09-26 PROCEDURE — 86900 BLOOD TYPING SEROLOGIC ABO: CPT

## 2023-09-26 PROCEDURE — 83735 ASSAY OF MAGNESIUM: CPT

## 2023-09-26 PROCEDURE — 85025 COMPLETE CBC W/AUTO DIFF WBC: CPT

## 2023-09-26 PROCEDURE — 74177 CT ABD & PELVIS W/CONTRAST: CPT | Mod: 26,MA

## 2023-09-26 PROCEDURE — 83690 ASSAY OF LIPASE: CPT

## 2023-09-26 PROCEDURE — 86850 RBC ANTIBODY SCREEN: CPT

## 2023-09-26 PROCEDURE — 93010 ELECTROCARDIOGRAM REPORT: CPT

## 2023-09-26 PROCEDURE — 71045 X-RAY EXAM CHEST 1 VIEW: CPT | Mod: 26

## 2023-09-26 PROCEDURE — 85610 PROTHROMBIN TIME: CPT

## 2023-09-26 PROCEDURE — 93005 ELECTROCARDIOGRAM TRACING: CPT

## 2023-09-26 PROCEDURE — 85730 THROMBOPLASTIN TIME PARTIAL: CPT

## 2023-09-26 PROCEDURE — 96374 THER/PROPH/DIAG INJ IV PUSH: CPT | Mod: XU

## 2023-09-26 PROCEDURE — 71045 X-RAY EXAM CHEST 1 VIEW: CPT

## 2023-09-26 PROCEDURE — 96375 TX/PRO/DX INJ NEW DRUG ADDON: CPT

## 2023-09-26 PROCEDURE — 80307 DRUG TEST PRSMV CHEM ANLYZR: CPT

## 2023-09-26 PROCEDURE — 87086 URINE CULTURE/COLONY COUNT: CPT

## 2023-09-26 PROCEDURE — 82962 GLUCOSE BLOOD TEST: CPT

## 2023-09-26 PROCEDURE — 80053 COMPREHEN METABOLIC PANEL: CPT

## 2023-09-26 PROCEDURE — 86901 BLOOD TYPING SEROLOGIC RH(D): CPT

## 2023-09-26 PROCEDURE — 84484 ASSAY OF TROPONIN QUANT: CPT

## 2023-09-26 PROCEDURE — 74177 CT ABD & PELVIS W/CONTRAST: CPT | Mod: MA

## 2023-09-26 PROCEDURE — 81001 URINALYSIS AUTO W/SCOPE: CPT

## 2023-09-26 RX ORDER — ONDANSETRON 8 MG/1
4 TABLET, FILM COATED ORAL ONCE
Refills: 0 | Status: COMPLETED | OUTPATIENT
Start: 2023-09-26 | End: 2023-09-26

## 2023-09-26 RX ORDER — SODIUM CHLORIDE 9 MG/ML
1000 INJECTION, SOLUTION INTRAVENOUS
Refills: 0 | Status: COMPLETED | OUTPATIENT
Start: 2023-09-26 | End: 2023-09-26

## 2023-09-26 RX ORDER — SODIUM CHLORIDE 9 MG/ML
1000 INJECTION, SOLUTION INTRAVENOUS ONCE
Refills: 0 | Status: DISCONTINUED | OUTPATIENT
Start: 2023-09-26 | End: 2023-09-27

## 2023-09-26 RX ORDER — FAMOTIDINE 10 MG/ML
20 INJECTION INTRAVENOUS ONCE
Refills: 0 | Status: COMPLETED | OUTPATIENT
Start: 2023-09-26 | End: 2023-09-26

## 2023-09-26 RX ADMIN — FAMOTIDINE 20 MILLIGRAM(S): 10 INJECTION INTRAVENOUS at 23:15

## 2023-09-26 RX ADMIN — ONDANSETRON 4 MILLIGRAM(S): 8 TABLET, FILM COATED ORAL at 23:15

## 2023-09-26 RX ADMIN — SODIUM CHLORIDE 1000 MILLILITER(S): 9 INJECTION, SOLUTION INTRAVENOUS at 23:14

## 2023-09-26 NOTE — ED ADULT NURSE NOTE - NSFALLRISKINTERV_ED_ALL_ED
Assistance OOB with selected safe patient handling equipment if applicable/Assistance with ambulation/Communicate fall risk and risk factors to all staff, patient, and family/Monitor gait and stability/Provide visual cue: yellow wristband, yellow gown, etc/Reinforce activity limits and safety measures with patient and family/Call bell, personal items and telephone in reach/Instruct patient to call for assistance before getting out of bed/chair/stretcher/Non-slip footwear applied when patient is off stretcher/[place] to call system/Physically safe environment - no spills, clutter or unnecessary equipment/Purposeful Proactive Rounding/Room/bathroom lighting operational, light cord in reach

## 2023-09-26 NOTE — ED PROVIDER NOTE - PATIENT PORTAL LINK FT
You can access the FollowMyHealth Patient Portal offered by [facility] by registering at the following website: [email or web address]. By joining [employer]’s FollowMyHealth portal, you will also be able to view your health information using other applications (apps) compatible with our system.
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
## 2023-09-26 NOTE — ED ADULT NURSE NOTE - SUICIDE SCREENING QUESTION 2
Patient unable to complete Fluconazole Counseling:  Patient counseled regarding adverse effects of fluconazole including but not limited to headache, diarrhea, nausea, upset stomach, liver function test abnormalities, taste disturbance, and stomach pain.  There is a rare possibility of liver failure that can occur when taking fluconazole.  The patient understands that monitoring of LFTs and kidney function test may be required, especially at baseline. The patient verbalized understanding of the proper use and possible adverse effects of fluconazole.  All of the patient's questions and concerns were addressed.

## 2023-09-26 NOTE — ED PROVIDER NOTE - CARE PLAN
Assessment and plan of treatment:	ab pain  labs, imaging, supportive care   1 Principal Discharge DX:	Abdominal pain  Assessment and plan of treatment:	ab pain  labs, imaging, supportive care  Secondary Diagnosis:	Lightheadedness

## 2023-09-26 NOTE — ED PROVIDER NOTE - CLINICAL SUMMARY MEDICAL DECISION MAKING FREE TEXT BOX
pt w gen weakness, malaise, nausea, dehydration  labs, imaging, hydrated, sx improved  dc home outpt follow up

## 2023-09-26 NOTE — ED PROVIDER NOTE - NSFOLLOWUPINSTRUCTIONS_ED_ALL_ED_FT
Please make sure to follow up with your primary care doctor in 3 days.    Abdominal Pain    Many things can cause abdominal pain. Usually, abdominal pain is not caused by a disease and will improve without treatment. Your health care provider will do a physical exam and possibly order blood tests and imaging to help determine the seriousness of your pain. However, in many cases, no cause may be found and you may need further testing as an outpatient. Monitor your abdominal pain for any changes.     SEEK IMMEDIATE MEDICAL CARE IF YOU HAVE THE FOLLOWING SYMPTOMS: worsening abdominal pain, vomiting, diarrhea, inability to have bowel movements or pass gas, black or bloody stool, fever accompanying chest pain or back pain, or dizziness/lightheadedness.

## 2023-09-26 NOTE — ED PROVIDER NOTE - PHYSICAL EXAMINATION
CONSTITUTIONAL: in no apparent distress.   HEAD: Normocephalic; atraumatic.   EYES: Pupils are round and reactive, extra-ocular muscles are intact. Eyelids are normal in appearance without swelling or lesions.   ENT: Hearing is intact with good acuity to spoken voice.  Patient is speaking clearly, not muffled and airway is intact.   RESPIRATORY: No signs of respiratory distress. Lung sounds are clear in all lobes bilaterally without rales, rhonchi, or wheezes.  CARDIOVASCULAR: Regular rate and rhythm.   GI: tender to palpation in general abd area. Abdomen is soft, and without distention. Bowel sounds are present and normoactive in all four quadrants. No masses are noted.   BACK: No evidence of trauma or deformity. No midline tenderness. No CVA tenderness bilaterally. Normal ROM.   MS: Normal appearance and ROM in all four extremities. No tenderness to palpation and distal pulses are normal. Sensation to the upper and lower extremities is normal bilaterally. Steady gait noted.  NEURO: A & O x 3. Normal speech. No focal deficit.  PSYCHOLOGICAL: Appropriate mood and affect. Good judgement and insight.
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
## 2023-09-26 NOTE — ED PROVIDER NOTE - PROGRESS NOTE DETAILS
Labs unremarkable.  CT is normal.  UA shows no evidence of UTI.  Fluids given in ED and the symptom improved.  Patient is able to ambulate and is stable for discharge.
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
## 2023-09-27 LAB
ALBUMIN SERPL ELPH-MCNC: 5.4 G/DL — HIGH (ref 3.5–5.2)
ALP SERPL-CCNC: 138 U/L — HIGH (ref 30–115)
ALT FLD-CCNC: 22 U/L — SIGNIFICANT CHANGE UP (ref 0–41)
ANION GAP SERPL CALC-SCNC: 17 MMOL/L — HIGH (ref 7–14)
APPEARANCE UR: CLEAR — SIGNIFICANT CHANGE UP
AST SERPL-CCNC: 23 U/L — SIGNIFICANT CHANGE UP (ref 0–41)
BILIRUB SERPL-MCNC: 0.6 MG/DL — SIGNIFICANT CHANGE UP (ref 0.2–1.2)
BILIRUB UR-MCNC: NEGATIVE — SIGNIFICANT CHANGE UP
BUN SERPL-MCNC: 13 MG/DL — SIGNIFICANT CHANGE UP (ref 10–20)
CALCIUM SERPL-MCNC: 10.4 MG/DL — SIGNIFICANT CHANGE UP (ref 8.4–10.5)
CHLORIDE SERPL-SCNC: 98 MMOL/L — SIGNIFICANT CHANGE UP (ref 98–110)
CO2 SERPL-SCNC: 23 MMOL/L — SIGNIFICANT CHANGE UP (ref 17–32)
COLOR SPEC: YELLOW — SIGNIFICANT CHANGE UP
CREAT SERPL-MCNC: 0.8 MG/DL — SIGNIFICANT CHANGE UP (ref 0.7–1.5)
DIFF PNL FLD: NEGATIVE — SIGNIFICANT CHANGE UP
EGFR: 119 ML/MIN/1.73M2 — SIGNIFICANT CHANGE UP
GLUCOSE SERPL-MCNC: 124 MG/DL — HIGH (ref 70–99)
GLUCOSE UR QL: >=1000 MG/DL
KETONES UR-MCNC: NEGATIVE MG/DL — SIGNIFICANT CHANGE UP
LEUKOCYTE ESTERASE UR-ACNC: ABNORMAL
LIDOCAIN IGE QN: 33 U/L — SIGNIFICANT CHANGE UP (ref 7–60)
MAGNESIUM SERPL-MCNC: 1.9 MG/DL — SIGNIFICANT CHANGE UP (ref 1.8–2.4)
NITRITE UR-MCNC: NEGATIVE — SIGNIFICANT CHANGE UP
PH UR: 7.5 — SIGNIFICANT CHANGE UP (ref 5–8)
POTASSIUM SERPL-MCNC: 3.8 MMOL/L — SIGNIFICANT CHANGE UP (ref 3.5–5)
POTASSIUM SERPL-SCNC: 3.8 MMOL/L — SIGNIFICANT CHANGE UP (ref 3.5–5)
PROT SERPL-MCNC: 8.8 G/DL — HIGH (ref 6–8)
PROT UR-MCNC: NEGATIVE MG/DL — SIGNIFICANT CHANGE UP
SODIUM SERPL-SCNC: 138 MMOL/L — SIGNIFICANT CHANGE UP (ref 135–146)
SP GR SPEC: 1.03 — SIGNIFICANT CHANGE UP (ref 1–1.03)
UROBILINOGEN FLD QL: 0.2 MG/DL — SIGNIFICANT CHANGE UP (ref 0.2–1)

## 2023-09-27 PROCEDURE — 70450 CT HEAD/BRAIN W/O DYE: CPT | Mod: 26,MA

## 2023-09-28 ENCOUNTER — APPOINTMENT (OUTPATIENT)
Dept: NEUROLOGY | Facility: CLINIC | Age: 33
End: 2023-09-28

## 2023-09-29 LAB
CULTURE RESULTS: NO GROWTH — SIGNIFICANT CHANGE UP
SPECIMEN SOURCE: SIGNIFICANT CHANGE UP

## 2023-10-11 ENCOUNTER — APPOINTMENT (OUTPATIENT)
Dept: NEUROLOGY | Facility: CLINIC | Age: 33
End: 2023-10-11
Payer: COMMERCIAL

## 2023-10-11 DIAGNOSIS — R41.82 ALTERED MENTAL STATUS, UNSPECIFIED: ICD-10-CM

## 2023-10-11 PROCEDURE — 99205 OFFICE O/P NEW HI 60 MIN: CPT

## 2023-11-07 ENCOUNTER — APPOINTMENT (OUTPATIENT)
Dept: MRI IMAGING | Facility: CLINIC | Age: 33
End: 2023-11-07

## 2023-11-14 ENCOUNTER — NON-APPOINTMENT (OUTPATIENT)
Age: 33
End: 2023-11-14

## 2023-11-15 ENCOUNTER — APPOINTMENT (OUTPATIENT)
Dept: NEUROLOGY | Facility: CLINIC | Age: 33
End: 2023-11-15

## 2024-08-23 ENCOUNTER — NON-APPOINTMENT (OUTPATIENT)
Age: 34
End: 2024-08-23

## 2024-09-03 NOTE — ED PROVIDER NOTE - IV ALTEPLASE DOOR HIDDEN
Oral fluids-keep throat moist at all times   Rest  Soft foods for throat pain   Warm salt water gargles   Ibuprofen and/or tylenol for pain relief     show

## 2024-10-15 NOTE — ED PROVIDER NOTE - DISPOSITION TYPE
----- Message from Graciela Martini PA-C sent at 10/15/2024  9:32 AM EDT -----  Mohs order placed!  ----- Message -----  From: Lab, Background User  Sent: 10/10/2024   1:28 PM EDT  To: Graciela Martini PA-C   DISCHARGE

## 2025-05-20 NOTE — ED ADULT NURSE NOTE - PAIN RATING/NUMBER SCALE (0-10): ACTIVITY
Called patient to remind them of their appointment   -Pt confirmed      Reminded patient of their     copay for their appointment. Reminded patient to complete their visit pre-check/digital registration in [employer].    Electronically signed by Alicia D Giraldo-Reyes on 5/20/2025 at 2:12 PM      5